# Patient Record
Sex: FEMALE | Race: WHITE | NOT HISPANIC OR LATINO | Employment: OTHER | URBAN - METROPOLITAN AREA
[De-identification: names, ages, dates, MRNs, and addresses within clinical notes are randomized per-mention and may not be internally consistent; named-entity substitution may affect disease eponyms.]

---

## 2021-01-14 DIAGNOSIS — Z23 NEED FOR VACCINATION: ICD-10-CM

## 2022-05-13 PROBLEM — M65.322 TRIGGER INDEX FINGER OF LEFT HAND: Status: ACTIVE | Noted: 2022-05-13

## 2022-05-27 PROBLEM — E66.9 OBESITY (BMI 30-39.9): Status: ACTIVE | Noted: 2022-05-27

## 2022-05-27 PROBLEM — E11.9 DIABETES MELLITUS, TYPE 2 (HCC): Status: ACTIVE | Noted: 2022-05-27

## 2022-05-27 PROBLEM — K21.9 GASTROESOPHAGEAL REFLUX DISEASE: Status: ACTIVE | Noted: 2022-05-27

## 2023-09-13 ENCOUNTER — APPOINTMENT (OUTPATIENT)
Dept: ADMISSIONS | Facility: MEDICAL CENTER | Age: 78
End: 2023-09-13
Attending: NEUROLOGICAL SURGERY
Payer: MEDICARE

## 2023-09-18 ENCOUNTER — PRE-ADMISSION TESTING (OUTPATIENT)
Dept: ADMISSIONS | Facility: MEDICAL CENTER | Age: 78
End: 2023-09-18
Attending: NEUROLOGICAL SURGERY
Payer: MEDICARE

## 2023-09-20 ENCOUNTER — APPOINTMENT (OUTPATIENT)
Dept: RADIOLOGY | Facility: MEDICAL CENTER | Age: 78
End: 2023-09-20
Attending: PHYSICIAN ASSISTANT
Payer: MEDICARE

## 2023-09-20 ENCOUNTER — HOSPITAL ENCOUNTER (OUTPATIENT)
Facility: MEDICAL CENTER | Age: 78
End: 2023-09-20
Attending: NEUROLOGICAL SURGERY | Admitting: NEUROLOGICAL SURGERY
Payer: MEDICARE

## 2023-09-20 VITALS
DIASTOLIC BLOOD PRESSURE: 81 MMHG | RESPIRATION RATE: 14 BRPM | SYSTOLIC BLOOD PRESSURE: 143 MMHG | HEART RATE: 56 BPM | BODY MASS INDEX: 36.89 KG/M2 | TEMPERATURE: 97.5 F | WEIGHT: 243.39 LBS | OXYGEN SATURATION: 97 % | HEIGHT: 68 IN

## 2023-09-20 DIAGNOSIS — M54.50 LOW BACK PAIN, UNSPECIFIED BACK PAIN LATERALITY, UNSPECIFIED CHRONICITY, UNSPECIFIED WHETHER SCIATICA PRESENT: ICD-10-CM

## 2023-09-20 DIAGNOSIS — M54.16 LUMBAR RADICULOPATHY: ICD-10-CM

## 2023-09-20 LAB
ERYTHROCYTE [DISTWIDTH] IN BLOOD BY AUTOMATED COUNT: 39.8 FL (ref 35.9–50)
HCT VFR BLD AUTO: 41.9 % (ref 37–47)
HGB BLD-MCNC: 14.2 G/DL (ref 12–16)
INR PPP: 1.05 (ref 0.87–1.13)
MCH RBC QN AUTO: 29 PG (ref 27–33)
MCHC RBC AUTO-ENTMCNC: 33.9 G/DL (ref 32.2–35.5)
MCV RBC AUTO: 85.5 FL (ref 81.4–97.8)
PLATELET # BLD AUTO: 257 K/UL (ref 164–446)
PMV BLD AUTO: 9.2 FL (ref 9–12.9)
PROTHROMBIN TIME: 13.8 SEC (ref 12–14.6)
RBC # BLD AUTO: 4.9 M/UL (ref 4.2–5.4)
WBC # BLD AUTO: 6.9 K/UL (ref 4.8–10.8)

## 2023-09-20 PROCEDURE — 62284 INJECTION FOR MYELOGRAM: CPT

## 2023-09-20 PROCEDURE — 85027 COMPLETE CBC AUTOMATED: CPT

## 2023-09-20 PROCEDURE — 72132 CT LUMBAR SPINE W/DYE: CPT

## 2023-09-20 PROCEDURE — 85610 PROTHROMBIN TIME: CPT

## 2023-09-20 PROCEDURE — 160047 HCHG PACU  - EA ADDL 30 MINS PHASE II

## 2023-09-20 PROCEDURE — 700117 HCHG RX CONTRAST REV CODE 255: Performed by: PHYSICIAN ASSISTANT

## 2023-09-20 PROCEDURE — 160002 HCHG RECOVERY MINUTES (STAT)

## 2023-09-20 PROCEDURE — 160046 HCHG PACU - 1ST 60 MINS PHASE II

## 2023-09-20 PROCEDURE — 36415 COLL VENOUS BLD VENIPUNCTURE: CPT

## 2023-09-20 RX ADMIN — IOHEXOL 12 ML: 180 INJECTION INTRAVENOUS at 15:00

## 2023-09-20 NOTE — DISCHARGE INSTRUCTIONS
Myelogram  A myelogram is an imaging test. This test checks for problems in the spinal cord and the places where nerves attach to the spinal cord (nerve roots).  A dye (contrast material) is put into your spine before the X-ray. This provides a clearer image for your doctor to see.  You may need this test if you have a spinal cord problem that cannot be diagnosed with other imaging tests. You may also have this test to check your spine after surgery.  Tell a doctor about:  Any allergies you have, especially to iodine.  All medicines you are taking, including vitamins, herbs, eye drops, creams, and over-the-counter medicines.  Any problems you or family members have had with anesthetic medicines or dye.  Any blood disorders you have.  Any surgeries you have had.  Any medical conditions you have or have had, including asthma.  Whether you are pregnant or may be pregnant.  What are the risks?  Generally, this is a safe procedure. However, problems may occur, including:  Infection.  Bleeding.  Allergic reaction to medicines or dyes.  Damage to your spinal cord or nerves.  Leaking of spinal fluid. This can cause a headache.  Damage to kidneys.  Seizures. This is rare.  What happens before the procedure?  Follow instructions from your doctor about what you cannot eat or drink. You may be asked to drink more fluids.  Ask your doctor about changing or stopping your normal medicines. This is important if you take diabetes medicines or blood thinners.  Plan to have someone take you home from the hospital or clinic.  If you will be going home right after the procedure, plan to have someone with you for 24 hours.  What happens during the procedure?  You will lie face down on a table.  Your doctor will find the best injection site on your spine. This is most often in the lower back.  This area will be washed with soap.  You will be given a medicine to numb the area (local anesthetic).  Your doctor will place a long needle into  the space around your spinal cord.  A sample of spinal fluid may be taken. This may be sent to the lab for testing.  The dye will be injected into the space around your spinal cord.  The exam table may be tilted. This helps the dye flow up or down your spine.  The X-ray will take images of your spinal cord.  A bandage (dressing) may be placed over the area where the dye was injected.  The procedure may vary among doctors and hospitals.  What can I expect after the procedure?  You may be monitored until you leave the hospital or clinic. This includes checking your blood pressure, heart rate, breathing rate, and blood oxygen level.  You may feel sore at the injection site. You may have a mild headache.  You will be told to lie flat with your head raised (elevated). This lowers the risk of a headache.  It is up to you to get the results of your procedure. Ask your doctor, or the department that is doing the procedure, when your results will be ready.  Follow these instructions at home:  Rest as told by your doctor. Lie flat with your head slightly elevated.  Do not bend, lift, or do hard work for 24-48 hours, or as told by your doctor.  Take over-the-counter and prescription medicines only as told by your doctor.  Take care of your bandage as told by your doctor. Keep clean and dry for 48 hours. So not soak or submerge (pools/hot tubs/baths) until cleared by physician.   Drink enough fluid to keep your pee (urine) pale yellow.  Bathe or shower as told by your doctor.  Contact a doctor if:  You have a fever.  You have a headache that lasts longer than 24 hours.  You feel sick to your stomach (nauseous).  You vomit.  Your neck is stiff.  Your legs feel numb.  You cannot pee.  You cannot poop (no bowel movement).  You have a rash.  You are itchy or sneezing.  Get help right away if:  You have new symptoms or your symptoms get worse.  You have a seizure.  You have trouble breathing.  Summary  A myelogram is an imaging test  that checks for problems in the spinal cord and the places where nerves attach to the spinal cord (nerve roots).  Before the procedure, follow instructions from your doctor. You will be told what not to eat or drink, or what medicines to change or stop.  After the procedure, you will be told to lie flat with your head raised (elevated). This will lower your risk of a headache.  Do not bend, lift, or do any hard work for 24-48 hours, or as told by your doctor.  Contact a doctor if you have a stiff neck or numb legs. Get help right away if your symptoms get worse, or you have a seizure or trouble breathing.  This information is not intended to replace advice given to you by your health care provider. Make sure you discuss any questions you have with your health care provider.  Document Revised: 04/11/2023 Document Reviewed: 02/27/2020  Elsevier Patient Education © 2023 [employer]

## 2023-09-20 NOTE — OR NURSING
1500  Assumed care of pt after report received. Remains on strict flat bedrest until 1544 reinforced with understanding verbalized.    1600  Bed rest complete, bandaid cdi. Ambulated well and voided. Ready for DC home iv dc with cath intact.

## 2023-09-20 NOTE — PROGRESS NOTES
1420- Patient admitted to recovery phase 2. A&Ox4 on room air. Lying flat on back. Dressing to midback CDI. Patient's family notified of bed rest until 1544.    1455- Handoff given to SUNITA Dempsey

## 2023-11-29 ENCOUNTER — PATIENT MESSAGE (OUTPATIENT)
Dept: HEALTH INFORMATION MANAGEMENT | Facility: OTHER | Age: 78
End: 2023-11-29

## 2024-02-16 ENCOUNTER — APPOINTMENT (OUTPATIENT)
Dept: ADMISSIONS | Facility: MEDICAL CENTER | Age: 79
End: 2024-02-16
Attending: NEUROLOGICAL SURGERY
Payer: MEDICARE

## 2024-02-16 PROBLEM — M48.062 NEUROGENIC CLAUDICATION DUE TO LUMBAR SPINAL STENOSIS: Status: ACTIVE | Noted: 2024-02-16

## 2024-02-21 ENCOUNTER — PRE-ADMISSION TESTING (OUTPATIENT)
Dept: ADMISSIONS | Facility: MEDICAL CENTER | Age: 79
End: 2024-02-21
Attending: NEUROLOGICAL SURGERY
Payer: MEDICARE

## 2024-02-21 RX ORDER — NAPROXEN SODIUM 220 MG
220 TABLET ORAL
Status: ON HOLD | COMMUNITY
End: 2024-03-27

## 2024-02-21 RX ORDER — CHOLECALCIFEROL (VITAMIN D3) 125 MCG
1 CAPSULE ORAL 2 TIMES DAILY
COMMUNITY

## 2024-02-22 ENCOUNTER — HOSPITAL ENCOUNTER (OUTPATIENT)
Dept: RADIOLOGY | Facility: MEDICAL CENTER | Age: 79
End: 2024-02-22
Attending: NEUROLOGICAL SURGERY | Admitting: NEUROLOGICAL SURGERY
Payer: MEDICARE

## 2024-02-22 ENCOUNTER — PRE-ADMISSION TESTING (OUTPATIENT)
Dept: ADMISSIONS | Facility: MEDICAL CENTER | Age: 79
End: 2024-02-22
Attending: NEUROLOGICAL SURGERY
Payer: MEDICARE

## 2024-02-22 DIAGNOSIS — M48.062 SPINAL STENOSIS OF LUMBAR REGION WITH NEUROGENIC CLAUDICATION: ICD-10-CM

## 2024-02-22 LAB
25(OH)D3 SERPL-MCNC: 63 NG/ML (ref 30–100)
ABO GROUP BLD: NORMAL
ANION GAP SERPL CALC-SCNC: 13 MMOL/L (ref 7–16)
APPEARANCE UR: CLEAR
APTT PPP: 30.4 SEC (ref 24.7–36)
BASOPHILS # BLD AUTO: 0.4 % (ref 0–1.8)
BASOPHILS # BLD: 0.03 K/UL (ref 0–0.12)
BILIRUB UR QL STRIP.AUTO: NEGATIVE
BLD GP AB SCN SERPL QL: NORMAL
BUN SERPL-MCNC: 16 MG/DL (ref 8–22)
CALCIUM SERPL-MCNC: 10.3 MG/DL (ref 8.5–10.5)
CHLORIDE SERPL-SCNC: 102 MMOL/L (ref 96–112)
CO2 SERPL-SCNC: 25 MMOL/L (ref 20–33)
COLOR UR: YELLOW
CREAT SERPL-MCNC: 0.75 MG/DL (ref 0.5–1.4)
EKG IMPRESSION: NORMAL
EOSINOPHIL # BLD AUTO: 0.09 K/UL (ref 0–0.51)
EOSINOPHIL NFR BLD: 1.3 % (ref 0–6.9)
ERYTHROCYTE [DISTWIDTH] IN BLOOD BY AUTOMATED COUNT: 40.3 FL (ref 35.9–50)
EST. AVERAGE GLUCOSE BLD GHB EST-MCNC: 131 MG/DL
GFR SERPLBLD CREATININE-BSD FMLA CKD-EPI: 81 ML/MIN/1.73 M 2
GLUCOSE SERPL-MCNC: 121 MG/DL (ref 65–99)
GLUCOSE UR STRIP.AUTO-MCNC: NEGATIVE MG/DL
HBA1C MFR BLD: 6.2 % (ref 4–5.6)
HCT VFR BLD AUTO: 44.7 % (ref 37–47)
HGB BLD-MCNC: 15 G/DL (ref 12–16)
IMM GRANULOCYTES # BLD AUTO: 0.01 K/UL (ref 0–0.11)
IMM GRANULOCYTES NFR BLD AUTO: 0.1 % (ref 0–0.9)
INR PPP: 1.01 (ref 0.87–1.13)
KETONES UR STRIP.AUTO-MCNC: NEGATIVE MG/DL
LEUKOCYTE ESTERASE UR QL STRIP.AUTO: NEGATIVE
LYMPHOCYTES # BLD AUTO: 2.31 K/UL (ref 1–4.8)
LYMPHOCYTES NFR BLD: 34.3 % (ref 22–41)
MCH RBC QN AUTO: 28.9 PG (ref 27–33)
MCHC RBC AUTO-ENTMCNC: 33.6 G/DL (ref 32.2–35.5)
MCV RBC AUTO: 86.1 FL (ref 81.4–97.8)
MICRO URNS: NORMAL
MONOCYTES # BLD AUTO: 0.45 K/UL (ref 0–0.85)
MONOCYTES NFR BLD AUTO: 6.7 % (ref 0–13.4)
NEUTROPHILS # BLD AUTO: 3.85 K/UL (ref 1.82–7.42)
NEUTROPHILS NFR BLD: 57.2 % (ref 44–72)
NITRITE UR QL STRIP.AUTO: NEGATIVE
NRBC # BLD AUTO: 0 K/UL
NRBC BLD-RTO: 0 /100 WBC (ref 0–0.2)
PH UR STRIP.AUTO: 6.5 [PH] (ref 5–8)
PLATELET # BLD AUTO: 277 K/UL (ref 164–446)
PMV BLD AUTO: 9.6 FL (ref 9–12.9)
POTASSIUM SERPL-SCNC: 4.3 MMOL/L (ref 3.6–5.5)
PROT UR QL STRIP: NEGATIVE MG/DL
PROTHROMBIN TIME: 13.4 SEC (ref 12–14.6)
RBC # BLD AUTO: 5.19 M/UL (ref 4.2–5.4)
RBC UR QL AUTO: NEGATIVE
RH BLD: NORMAL
SODIUM SERPL-SCNC: 140 MMOL/L (ref 135–145)
SP GR UR STRIP.AUTO: 1.01
UROBILINOGEN UR STRIP.AUTO-MCNC: 0.2 MG/DL
WBC # BLD AUTO: 6.7 K/UL (ref 4.8–10.8)

## 2024-02-22 PROCEDURE — 86900 BLOOD TYPING SEROLOGIC ABO: CPT

## 2024-02-22 PROCEDURE — 82306 VITAMIN D 25 HYDROXY: CPT

## 2024-02-22 PROCEDURE — 85730 THROMBOPLASTIN TIME PARTIAL: CPT

## 2024-02-22 PROCEDURE — 85025 COMPLETE CBC W/AUTO DIFF WBC: CPT

## 2024-02-22 PROCEDURE — 85610 PROTHROMBIN TIME: CPT

## 2024-02-22 PROCEDURE — 86850 RBC ANTIBODY SCREEN: CPT

## 2024-02-22 PROCEDURE — 80048 BASIC METABOLIC PNL TOTAL CA: CPT

## 2024-02-22 PROCEDURE — 83036 HEMOGLOBIN GLYCOSYLATED A1C: CPT | Mod: GA

## 2024-02-22 PROCEDURE — 93005 ELECTROCARDIOGRAM TRACING: CPT

## 2024-02-22 PROCEDURE — 81003 URINALYSIS AUTO W/O SCOPE: CPT

## 2024-02-22 PROCEDURE — 93010 ELECTROCARDIOGRAM REPORT: CPT | Performed by: INTERNAL MEDICINE

## 2024-02-22 PROCEDURE — 36415 COLL VENOUS BLD VENIPUNCTURE: CPT

## 2024-02-22 PROCEDURE — 86901 BLOOD TYPING SEROLOGIC RH(D): CPT

## 2024-02-22 PROCEDURE — 71046 X-RAY EXAM CHEST 2 VIEWS: CPT

## 2024-02-24 ENCOUNTER — HOSPITAL ENCOUNTER (OUTPATIENT)
Dept: RADIOLOGY | Facility: MEDICAL CENTER | Age: 79
End: 2024-02-24
Attending: PHYSICIAN ASSISTANT
Payer: MEDICARE

## 2024-02-24 DIAGNOSIS — M25.561 ACUTE PAIN OF RIGHT KNEE: ICD-10-CM

## 2024-02-24 DIAGNOSIS — M17.11 PRIMARY OSTEOARTHRITIS OF RIGHT KNEE: ICD-10-CM

## 2024-02-24 PROCEDURE — 73700 CT LOWER EXTREMITY W/O DYE: CPT | Mod: RT

## 2024-03-27 ENCOUNTER — APPOINTMENT (OUTPATIENT)
Dept: RADIOLOGY | Facility: MEDICAL CENTER | Age: 79
End: 2024-03-27
Attending: NEUROLOGICAL SURGERY
Payer: MEDICARE

## 2024-03-27 ENCOUNTER — HOSPITAL ENCOUNTER (OUTPATIENT)
Facility: MEDICAL CENTER | Age: 79
End: 2024-03-28
Attending: NEUROLOGICAL SURGERY | Admitting: NEUROLOGICAL SURGERY
Payer: MEDICARE

## 2024-03-27 ENCOUNTER — ANESTHESIA (OUTPATIENT)
Dept: SURGERY | Facility: MEDICAL CENTER | Age: 79
End: 2024-03-27
Payer: MEDICARE

## 2024-03-27 ENCOUNTER — ANESTHESIA EVENT (OUTPATIENT)
Dept: SURGERY | Facility: MEDICAL CENTER | Age: 79
End: 2024-03-27
Payer: MEDICARE

## 2024-03-27 DIAGNOSIS — G89.18 POSTOPERATIVE PAIN AFTER SPINAL SURGERY: ICD-10-CM

## 2024-03-27 PROBLEM — M54.16 LUMBAR RADICULOPATHY: Status: ACTIVE | Noted: 2024-03-27

## 2024-03-27 LAB
ABO GROUP BLD: NORMAL
BLD GP AB SCN SERPL QL: NORMAL
GLUCOSE BLD STRIP.AUTO-MCNC: 119 MG/DL (ref 65–99)
RH BLD: NORMAL

## 2024-03-27 PROCEDURE — 700106 HCHG RX REV CODE 271: Performed by: NEUROLOGICAL SURGERY

## 2024-03-27 PROCEDURE — 160041 HCHG SURGERY MINUTES - EA ADDL 1 MIN LEVEL 4: Performed by: NEUROLOGICAL SURGERY

## 2024-03-27 PROCEDURE — 72020 X-RAY EXAM OF SPINE 1 VIEW: CPT

## 2024-03-27 PROCEDURE — 160036 HCHG PACU - EA ADDL 30 MINS PHASE I: Performed by: NEUROLOGICAL SURGERY

## 2024-03-27 PROCEDURE — 700101 HCHG RX REV CODE 250: Performed by: NEUROLOGICAL SURGERY

## 2024-03-27 PROCEDURE — 86901 BLOOD TYPING SEROLOGIC RH(D): CPT

## 2024-03-27 PROCEDURE — 700111 HCHG RX REV CODE 636 W/ 250 OVERRIDE (IP): Mod: JZ | Performed by: ANESTHESIOLOGY

## 2024-03-27 PROCEDURE — 700102 HCHG RX REV CODE 250 W/ 637 OVERRIDE(OP): Performed by: PHYSICIAN ASSISTANT

## 2024-03-27 PROCEDURE — 63047 LAM FACETEC & FORAMOT LUMBAR: CPT | Performed by: NEUROLOGICAL SURGERY

## 2024-03-27 PROCEDURE — 82962 GLUCOSE BLOOD TEST: CPT

## 2024-03-27 PROCEDURE — 160009 HCHG ANES TIME/MIN: Performed by: NEUROLOGICAL SURGERY

## 2024-03-27 PROCEDURE — 86900 BLOOD TYPING SEROLOGIC ABO: CPT

## 2024-03-27 PROCEDURE — 110454 HCHG SHELL REV 250: Performed by: NEUROLOGICAL SURGERY

## 2024-03-27 PROCEDURE — A4306 DRUG DELIVERY SYSTEM <=50 ML: HCPCS | Performed by: NEUROLOGICAL SURGERY

## 2024-03-27 PROCEDURE — 96375 TX/PRO/DX INJ NEW DRUG ADDON: CPT | Mod: XU

## 2024-03-27 PROCEDURE — 63048 LAM FACETEC &FORAMOT EA ADDL: CPT | Performed by: NEUROLOGICAL SURGERY

## 2024-03-27 PROCEDURE — G0378 HOSPITAL OBSERVATION PER HR: HCPCS

## 2024-03-27 PROCEDURE — A9270 NON-COVERED ITEM OR SERVICE: HCPCS | Performed by: PHYSICIAN ASSISTANT

## 2024-03-27 PROCEDURE — 86850 RBC ANTIBODY SCREEN: CPT

## 2024-03-27 PROCEDURE — 110371 HCHG SHELL REV 272: Performed by: NEUROLOGICAL SURGERY

## 2024-03-27 PROCEDURE — 96365 THER/PROPH/DIAG IV INF INIT: CPT | Mod: XU

## 2024-03-27 PROCEDURE — 96366 THER/PROPH/DIAG IV INF ADDON: CPT | Mod: XU

## 2024-03-27 PROCEDURE — 700105 HCHG RX REV CODE 258: Performed by: NEUROLOGICAL SURGERY

## 2024-03-27 PROCEDURE — 160035 HCHG PACU - 1ST 60 MINS PHASE I: Performed by: NEUROLOGICAL SURGERY

## 2024-03-27 PROCEDURE — 63047 LAM FACETEC & FORAMOT LUMBAR: CPT | Mod: ASROC | Performed by: PHYSICIAN ASSISTANT

## 2024-03-27 PROCEDURE — 700111 HCHG RX REV CODE 636 W/ 250 OVERRIDE (IP): Performed by: PHYSICIAN ASSISTANT

## 2024-03-27 PROCEDURE — 700111 HCHG RX REV CODE 636 W/ 250 OVERRIDE (IP): Mod: JZ | Performed by: NEUROLOGICAL SURGERY

## 2024-03-27 PROCEDURE — 160002 HCHG RECOVERY MINUTES (STAT): Performed by: NEUROLOGICAL SURGERY

## 2024-03-27 PROCEDURE — 700101 HCHG RX REV CODE 250: Performed by: ANESTHESIOLOGY

## 2024-03-27 PROCEDURE — 700101 HCHG RX REV CODE 250: Performed by: PHYSICIAN ASSISTANT

## 2024-03-27 PROCEDURE — 36415 COLL VENOUS BLD VENIPUNCTURE: CPT

## 2024-03-27 PROCEDURE — 160048 HCHG OR STATISTICAL LEVEL 1-5: Performed by: NEUROLOGICAL SURGERY

## 2024-03-27 PROCEDURE — 160029 HCHG SURGERY MINUTES - 1ST 30 MINS LEVEL 4: Performed by: NEUROLOGICAL SURGERY

## 2024-03-27 PROCEDURE — 63048 LAM FACETEC &FORAMOT EA ADDL: CPT | Mod: ASROC | Performed by: PHYSICIAN ASSISTANT

## 2024-03-27 RX ORDER — DEXAMETHASONE SODIUM PHOSPHATE 4 MG/ML
INJECTION, SOLUTION INTRA-ARTICULAR; INTRALESIONAL; INTRAMUSCULAR; INTRAVENOUS; SOFT TISSUE PRN
Status: DISCONTINUED | OUTPATIENT
Start: 2024-03-27 | End: 2024-03-27 | Stop reason: SURG

## 2024-03-27 RX ORDER — LABETALOL HYDROCHLORIDE 5 MG/ML
10 INJECTION, SOLUTION INTRAVENOUS
Status: DISCONTINUED | OUTPATIENT
Start: 2024-03-27 | End: 2024-03-28 | Stop reason: HOSPADM

## 2024-03-27 RX ORDER — VANCOMYCIN HYDROCHLORIDE 500 MG/10ML
INJECTION, POWDER, LYOPHILIZED, FOR SOLUTION INTRAVENOUS
Status: COMPLETED | OUTPATIENT
Start: 2024-03-27 | End: 2024-03-27

## 2024-03-27 RX ORDER — TRANEXAMIC ACID 100 MG/ML
INJECTION, SOLUTION INTRAVENOUS PRN
Status: DISCONTINUED | OUTPATIENT
Start: 2024-03-27 | End: 2024-03-27 | Stop reason: SURG

## 2024-03-27 RX ORDER — SODIUM CHLORIDE AND POTASSIUM CHLORIDE 150; 900 MG/100ML; MG/100ML
INJECTION, SOLUTION INTRAVENOUS CONTINUOUS
Status: DISCONTINUED | OUTPATIENT
Start: 2024-03-27 | End: 2024-03-28 | Stop reason: HOSPADM

## 2024-03-27 RX ORDER — POLYETHYLENE GLYCOL 3350 17 G/17G
1 POWDER, FOR SOLUTION ORAL 2 TIMES DAILY PRN
Status: DISCONTINUED | OUTPATIENT
Start: 2024-03-27 | End: 2024-03-28 | Stop reason: HOSPADM

## 2024-03-27 RX ORDER — TRANEXAMIC ACID 100 MG/ML
1000 INJECTION, SOLUTION INTRAVENOUS ONCE
Status: DISCONTINUED | OUTPATIENT
Start: 2024-03-27 | End: 2024-03-27 | Stop reason: HOSPADM

## 2024-03-27 RX ORDER — ONDANSETRON 4 MG/1
4 TABLET, ORALLY DISINTEGRATING ORAL EVERY 4 HOURS PRN
Status: DISCONTINUED | OUTPATIENT
Start: 2024-03-27 | End: 2024-03-28 | Stop reason: HOSPADM

## 2024-03-27 RX ORDER — ACETAMINOPHEN 500 MG
1000 TABLET ORAL EVERY 6 HOURS PRN
Status: DISCONTINUED | OUTPATIENT
Start: 2024-04-01 | End: 2024-03-28 | Stop reason: HOSPADM

## 2024-03-27 RX ORDER — CEFAZOLIN SODIUM 1 G/3ML
2 INJECTION, POWDER, FOR SOLUTION INTRAMUSCULAR; INTRAVENOUS ONCE
Status: DISCONTINUED | OUTPATIENT
Start: 2024-03-27 | End: 2024-03-27 | Stop reason: HOSPADM

## 2024-03-27 RX ORDER — OXYCODONE HCL 5 MG/5 ML
10 SOLUTION, ORAL ORAL
Status: DISCONTINUED | OUTPATIENT
Start: 2024-03-27 | End: 2024-03-27 | Stop reason: HOSPADM

## 2024-03-27 RX ORDER — BACLOFEN 10 MG/1
5 TABLET ORAL 3 TIMES DAILY PRN
Status: DISCONTINUED | OUTPATIENT
Start: 2024-03-27 | End: 2024-03-28 | Stop reason: HOSPADM

## 2024-03-27 RX ORDER — AMOXICILLIN 250 MG
1 CAPSULE ORAL NIGHTLY
Status: DISCONTINUED | OUTPATIENT
Start: 2024-03-27 | End: 2024-03-28 | Stop reason: HOSPADM

## 2024-03-27 RX ORDER — BUPIVACAINE HYDROCHLORIDE AND EPINEPHRINE 5; 5 MG/ML; UG/ML
INJECTION, SOLUTION EPIDURAL; INTRACAUDAL; PERINEURAL
Status: DISCONTINUED | OUTPATIENT
Start: 2024-03-27 | End: 2024-03-27 | Stop reason: HOSPADM

## 2024-03-27 RX ORDER — KETOROLAC TROMETHAMINE 15 MG/ML
INJECTION, SOLUTION INTRAMUSCULAR; INTRAVENOUS PRN
Status: DISCONTINUED | OUTPATIENT
Start: 2024-03-27 | End: 2024-03-27 | Stop reason: SURG

## 2024-03-27 RX ORDER — HYDROMORPHONE HYDROCHLORIDE 1 MG/ML
0.2 INJECTION, SOLUTION INTRAMUSCULAR; INTRAVENOUS; SUBCUTANEOUS
Status: DISCONTINUED | OUTPATIENT
Start: 2024-03-27 | End: 2024-03-27 | Stop reason: HOSPADM

## 2024-03-27 RX ORDER — MIDAZOLAM HYDROCHLORIDE 1 MG/ML
INJECTION INTRAMUSCULAR; INTRAVENOUS PRN
Status: DISCONTINUED | OUTPATIENT
Start: 2024-03-27 | End: 2024-03-27 | Stop reason: SURG

## 2024-03-27 RX ORDER — BISACODYL 10 MG
10 SUPPOSITORY, RECTAL RECTAL
Status: DISCONTINUED | OUTPATIENT
Start: 2024-03-27 | End: 2024-03-28 | Stop reason: HOSPADM

## 2024-03-27 RX ORDER — PHENYLEPHRINE HYDROCHLORIDE 10 MG/ML
INJECTION, SOLUTION INTRAMUSCULAR; INTRAVENOUS; SUBCUTANEOUS PRN
Status: DISCONTINUED | OUTPATIENT
Start: 2024-03-27 | End: 2024-03-27 | Stop reason: SURG

## 2024-03-27 RX ORDER — HYDROMORPHONE HYDROCHLORIDE 1 MG/ML
0.4 INJECTION, SOLUTION INTRAMUSCULAR; INTRAVENOUS; SUBCUTANEOUS
Status: DISCONTINUED | OUTPATIENT
Start: 2024-03-27 | End: 2024-03-27 | Stop reason: HOSPADM

## 2024-03-27 RX ORDER — MORPHINE SULFATE 4 MG/ML
2 INJECTION INTRAVENOUS EVERY 4 HOURS PRN
Status: DISCONTINUED | OUTPATIENT
Start: 2024-03-27 | End: 2024-03-28 | Stop reason: HOSPADM

## 2024-03-27 RX ORDER — HYDROMORPHONE HYDROCHLORIDE 1 MG/ML
0.1 INJECTION, SOLUTION INTRAMUSCULAR; INTRAVENOUS; SUBCUTANEOUS
Status: DISCONTINUED | OUTPATIENT
Start: 2024-03-27 | End: 2024-03-27 | Stop reason: HOSPADM

## 2024-03-27 RX ORDER — METHYLPREDNISOLONE ACETATE 40 MG/ML
INJECTION, SUSPENSION INTRA-ARTICULAR; INTRALESIONAL; INTRAMUSCULAR; SOFT TISSUE
Status: DISCONTINUED | OUTPATIENT
Start: 2024-03-27 | End: 2024-03-27 | Stop reason: HOSPADM

## 2024-03-27 RX ORDER — ONDANSETRON 2 MG/ML
4 INJECTION INTRAMUSCULAR; INTRAVENOUS
Status: DISCONTINUED | OUTPATIENT
Start: 2024-03-27 | End: 2024-03-27 | Stop reason: HOSPADM

## 2024-03-27 RX ORDER — AMOXICILLIN 250 MG
1 CAPSULE ORAL
Status: DISCONTINUED | OUTPATIENT
Start: 2024-03-27 | End: 2024-03-28 | Stop reason: HOSPADM

## 2024-03-27 RX ORDER — OXYCODONE HCL 5 MG/5 ML
5 SOLUTION, ORAL ORAL
Status: DISCONTINUED | OUTPATIENT
Start: 2024-03-27 | End: 2024-03-27 | Stop reason: HOSPADM

## 2024-03-27 RX ORDER — CEFAZOLIN SODIUM 1 G/3ML
INJECTION, POWDER, FOR SOLUTION INTRAMUSCULAR; INTRAVENOUS PRN
Status: DISCONTINUED | OUTPATIENT
Start: 2024-03-27 | End: 2024-03-27 | Stop reason: SURG

## 2024-03-27 RX ORDER — ONDANSETRON 2 MG/ML
INJECTION INTRAMUSCULAR; INTRAVENOUS PRN
Status: DISCONTINUED | OUTPATIENT
Start: 2024-03-27 | End: 2024-03-27 | Stop reason: SURG

## 2024-03-27 RX ORDER — DIPHENHYDRAMINE HYDROCHLORIDE 50 MG/ML
12.5 INJECTION INTRAMUSCULAR; INTRAVENOUS
Status: DISCONTINUED | OUTPATIENT
Start: 2024-03-27 | End: 2024-03-27 | Stop reason: HOSPADM

## 2024-03-27 RX ORDER — TRAMADOL HYDROCHLORIDE 50 MG/1
100 TABLET ORAL EVERY 4 HOURS PRN
Status: DISCONTINUED | OUTPATIENT
Start: 2024-03-27 | End: 2024-03-28 | Stop reason: HOSPADM

## 2024-03-27 RX ORDER — HALOPERIDOL 5 MG/ML
1 INJECTION INTRAMUSCULAR
Status: DISCONTINUED | OUTPATIENT
Start: 2024-03-27 | End: 2024-03-27 | Stop reason: HOSPADM

## 2024-03-27 RX ORDER — CEFAZOLIN SODIUM 1 G/3ML
INJECTION, POWDER, FOR SOLUTION INTRAMUSCULAR; INTRAVENOUS
Status: DISCONTINUED | OUTPATIENT
Start: 2024-03-27 | End: 2024-03-27 | Stop reason: HOSPADM

## 2024-03-27 RX ORDER — ENEMA 19; 7 G/133ML; G/133ML
1 ENEMA RECTAL
Status: DISCONTINUED | OUTPATIENT
Start: 2024-03-27 | End: 2024-03-28 | Stop reason: HOSPADM

## 2024-03-27 RX ORDER — DIPHENHYDRAMINE HCL 25 MG
25 TABLET ORAL EVERY 6 HOURS PRN
Status: DISCONTINUED | OUTPATIENT
Start: 2024-03-27 | End: 2024-03-28 | Stop reason: HOSPADM

## 2024-03-27 RX ORDER — TRAMADOL HYDROCHLORIDE 50 MG/1
50 TABLET ORAL EVERY 4 HOURS PRN
Status: DISCONTINUED | OUTPATIENT
Start: 2024-03-27 | End: 2024-03-28 | Stop reason: HOSPADM

## 2024-03-27 RX ORDER — ALPRAZOLAM 0.25 MG/1
0.25 TABLET ORAL 2 TIMES DAILY PRN
Status: DISCONTINUED | OUTPATIENT
Start: 2024-03-27 | End: 2024-03-28 | Stop reason: HOSPADM

## 2024-03-27 RX ORDER — SODIUM CHLORIDE, SODIUM LACTATE, POTASSIUM CHLORIDE, AND CALCIUM CHLORIDE .6; .31; .03; .02 G/100ML; G/100ML; G/100ML; G/100ML
IRRIGANT IRRIGATION
Status: DISCONTINUED | OUTPATIENT
Start: 2024-03-27 | End: 2024-03-27 | Stop reason: HOSPADM

## 2024-03-27 RX ORDER — MAGNESIUM HYDROXIDE 1200 MG/15ML
LIQUID ORAL
Status: COMPLETED | OUTPATIENT
Start: 2024-03-27 | End: 2024-03-27

## 2024-03-27 RX ORDER — DOCUSATE SODIUM 100 MG/1
100 CAPSULE, LIQUID FILLED ORAL 2 TIMES DAILY
Status: DISCONTINUED | OUTPATIENT
Start: 2024-03-27 | End: 2024-03-28 | Stop reason: HOSPADM

## 2024-03-27 RX ORDER — DIPHENHYDRAMINE HYDROCHLORIDE 50 MG/ML
25 INJECTION INTRAMUSCULAR; INTRAVENOUS EVERY 6 HOURS PRN
Status: DISCONTINUED | OUTPATIENT
Start: 2024-03-27 | End: 2024-03-28 | Stop reason: HOSPADM

## 2024-03-27 RX ORDER — ACETAMINOPHEN 500 MG
1000 TABLET ORAL EVERY 6 HOURS
Status: DISCONTINUED | OUTPATIENT
Start: 2024-03-27 | End: 2024-03-28 | Stop reason: HOSPADM

## 2024-03-27 RX ORDER — SODIUM CHLORIDE, SODIUM LACTATE, POTASSIUM CHLORIDE, CALCIUM CHLORIDE 600; 310; 30; 20 MG/100ML; MG/100ML; MG/100ML; MG/100ML
INJECTION, SOLUTION INTRAVENOUS CONTINUOUS
Status: ACTIVE | OUTPATIENT
Start: 2024-03-27 | End: 2024-03-27

## 2024-03-27 RX ORDER — MORPHINE SULFATE 0.5 MG/ML
INJECTION, SOLUTION EPIDURAL; INTRATHECAL; INTRAVENOUS
Status: DISCONTINUED | OUTPATIENT
Start: 2024-03-27 | End: 2024-03-27 | Stop reason: HOSPADM

## 2024-03-27 RX ORDER — SODIUM CHLORIDE, SODIUM LACTATE, POTASSIUM CHLORIDE, CALCIUM CHLORIDE 600; 310; 30; 20 MG/100ML; MG/100ML; MG/100ML; MG/100ML
INJECTION, SOLUTION INTRAVENOUS CONTINUOUS
Status: DISCONTINUED | OUTPATIENT
Start: 2024-03-27 | End: 2024-03-27 | Stop reason: HOSPADM

## 2024-03-27 RX ORDER — LIDOCAINE HYDROCHLORIDE 20 MG/ML
INJECTION, SOLUTION EPIDURAL; INFILTRATION; INTRACAUDAL; PERINEURAL PRN
Status: DISCONTINUED | OUTPATIENT
Start: 2024-03-27 | End: 2024-03-27 | Stop reason: SURG

## 2024-03-27 RX ORDER — ONDANSETRON 2 MG/ML
4 INJECTION INTRAMUSCULAR; INTRAVENOUS EVERY 4 HOURS PRN
Status: DISCONTINUED | OUTPATIENT
Start: 2024-03-27 | End: 2024-03-28 | Stop reason: HOSPADM

## 2024-03-27 RX ADMIN — SODIUM CHLORIDE, POTASSIUM CHLORIDE, SODIUM LACTATE AND CALCIUM CHLORIDE: 600; 310; 30; 20 INJECTION, SOLUTION INTRAVENOUS at 09:26

## 2024-03-27 RX ADMIN — TRANEXAMIC ACID 1000 MG: 100 INJECTION, SOLUTION INTRAVENOUS at 12:02

## 2024-03-27 RX ADMIN — TRAMADOL HYDROCHLORIDE 100 MG: 50 TABLET ORAL at 14:02

## 2024-03-27 RX ADMIN — KETOROLAC TROMETHAMINE 15 MG: 15 INJECTION, SOLUTION INTRAMUSCULAR; INTRAVENOUS at 13:43

## 2024-03-27 RX ADMIN — DEXAMETHASONE SODIUM PHOSPHATE 8 MG: 4 INJECTION INTRA-ARTICULAR; INTRALESIONAL; INTRAMUSCULAR; INTRAVENOUS; SOFT TISSUE at 12:02

## 2024-03-27 RX ADMIN — MIDAZOLAM HYDROCHLORIDE 2 MG: 1 INJECTION, SOLUTION INTRAMUSCULAR; INTRAVENOUS at 11:53

## 2024-03-27 RX ADMIN — DOCUSATE SODIUM 100 MG: 100 CAPSULE, LIQUID FILLED ORAL at 17:07

## 2024-03-27 RX ADMIN — LIDOCAINE HYDROCHLORIDE 100 MG: 20 INJECTION, SOLUTION EPIDURAL; INFILTRATION; INTRACAUDAL at 12:02

## 2024-03-27 RX ADMIN — ACETAMINOPHEN 1000 MG: 500 TABLET, FILM COATED ORAL at 17:07

## 2024-03-27 RX ADMIN — ONDANSETRON 4 MG: 2 INJECTION INTRAMUSCULAR; INTRAVENOUS at 12:02

## 2024-03-27 RX ADMIN — PROPOFOL 200 MG: 10 INJECTION, EMULSION INTRAVENOUS at 12:02

## 2024-03-27 RX ADMIN — CEFAZOLIN 2 G: 1 INJECTION, POWDER, FOR SOLUTION INTRAMUSCULAR; INTRAVENOUS at 12:05

## 2024-03-27 RX ADMIN — CEFAZOLIN 2 G: 10 INJECTION, POWDER, FOR SOLUTION INTRAVENOUS at 22:23

## 2024-03-27 RX ADMIN — FENTANYL CITRATE 100 MCG: 50 INJECTION, SOLUTION INTRAMUSCULAR; INTRAVENOUS at 12:02

## 2024-03-27 RX ADMIN — DOCUSATE SODIUM 50 MG AND SENNOSIDES 8.6 MG 1 TABLET: 8.6; 5 TABLET, FILM COATED ORAL at 21:15

## 2024-03-27 RX ADMIN — POTASSIUM CHLORIDE AND SODIUM CHLORIDE: 900; 150 INJECTION, SOLUTION INTRAVENOUS at 17:12

## 2024-03-27 RX ADMIN — LIDOCAINE HYDROCHLORIDE 0.5 ML: 10 INJECTION, SOLUTION EPIDURAL; INFILTRATION; INTRACAUDAL at 09:25

## 2024-03-27 RX ADMIN — PHENYLEPHRINE HYDROCHLORIDE 100 MCG: 10 INJECTION INTRAVENOUS at 12:38

## 2024-03-27 RX ADMIN — Medication 1 APPLICATOR: at 21:15

## 2024-03-27 RX ADMIN — METHOCARBAMOL 1000 MG: 100 INJECTION, SOLUTION INTRAMUSCULAR; INTRAVENOUS at 14:12

## 2024-03-27 RX ADMIN — SUGAMMADEX 200 MG: 100 INJECTION, SOLUTION INTRAVENOUS at 13:43

## 2024-03-27 ASSESSMENT — PAIN SCALES - WONG BAKER
WONGBAKER_NUMERICALRESPONSE: HURTS EVEN MORE
WONGBAKER_NUMERICALRESPONSE: HURTS A LITTLE MORE
WONGBAKER_NUMERICALRESPONSE: HURTS A WHOLE LOT
WONGBAKER_NUMERICALRESPONSE: HURTS A WHOLE LOT
WONGBAKER_NUMERICALRESPONSE: HURTS A LITTLE MORE
WONGBAKER_NUMERICALRESPONSE: HURTS A WHOLE LOT
WONGBAKER_NUMERICALRESPONSE: HURTS A LITTLE MORE

## 2024-03-27 ASSESSMENT — PAIN DESCRIPTION - PAIN TYPE
TYPE: ACUTE PAIN
TYPE: ACUTE PAIN;SURGICAL PAIN
TYPE: ACUTE PAIN

## 2024-03-27 NOTE — LETTER
February 26, 2024    Patient Name: Rylee Cheung  Surgeon Name: Chelsie Wiseman M.D.  Surgery Facility: [facility] ([employer] [facility])  Surgery Date: 3/27/2024    The time of your surgery is not final and may change up to and until the day of your surgery. You will be contacted 1-2 business days prior to your surgery date with your check-in and surgery time.    BEFORE YOUR SURGERY - WITH THE FACILITY  Pre Registration and/or Lab Work/Tests must be done within 60 days of your surgery date. These will be done at [facility], with an appointment. This appointment should be completed before your pre op appointment in our office.    On 3/18 - if you have not already heard from [facility] Pre Admission/Registration Department, please call them at [phone] option 2, then option 1, for an appointment.      BEFORE YOUR SURGERY - WITH YOUR SURGEONS OFFICE  Pre op Appointment:   Date: 03/07/24   Time: 3:00pm   Provider: Frankie Wilson PA-C    Location: [address] [place] [address]    Bring a list of all medications you are currently taking including the dosing and frequency.    Please read the MEDICATION INSTRUCTIONS below completely.    DAY OF YOUR SURGERY  Nothing to eat or drink and refrain from smoking any substance after midnight the day of your surgery. Smoking may interfere with the anesthetic and frequently produces nausea during the recovery period.    Continue taking all lifesaving medications, including the morning of your surgery with small sip of water.    You will need a responsible adult to drive you to and from your surgery.    AFTER YOUR SURGERY  2 Week Post op Appointment:   Date: 04/10/24   Time: 4:00PM  With: Frankie Wilson PA-C   Location: [facility]    6 Week Post op Appointment:   Date: 05/09/24   Time: 4:00PM   With: Frankie Wilson PA-C  Location: [facility]    MEDICATION INSTRUCTIONS  Do not take these medications prior to your procedure:             Anti-inflammatories: stop 7 days prior, restart when advised. For fusions avoid for 12 weeks after surgery            Naproxen (Naprosyn or Alleve)            Motrin            Ibuprofen            Nabumetone (Relafen)            Meloxicam (Mobic)            Celebrex            Salsalate            Diclofenac (Arthrotec, Voltaren, Flector)            Sulindac (Clinoril            Etodolac (Lodine)            Indomethacin (Indocin)            Ketoprofen            Ketorolac            Oxaprozin (Daypro)            Piroxicam (Feldene)            Blood thinners (stop after approval from the prescribing physician)            Aspirin (any dosage): Stop 14 days prior, restart 7 days after procedure            Any medications that contain aspirin in combination (ie: Excedrin migraine, Fiorinal, and Norgesic)            Warfarin (Coumadin): Stop 14 days prior, INR day of procedure, restart 2-3 weeks after procedure            Antiplatelet: Stop 14 days prior, restart 7 days after procedure            Ticlid (ticlopidine): Stop 14 days prior            Plavix (clopidogrel): Stop 7 days prior            Aggrenox or Dipyridamole: Stop 14 days prior            ReoPro (abciximab): Stop 14 days prior            Integrilin (eptifibatide): Stop 14 days prior            Aggrastat (tirofiban): Stop 14 days prior            Lovenox (Enoxaparin): Stop 24hrs before and restart 24hrs after procedure            Heparin: Stop 24hrs before             Dalteparin (Fragmin): Stop 24hrs before            Fondaparinux (Arixtra): Stop 24hrs before            Xeralto, Dabigatran (Pradaxa) Stop 5 days prior            Eliquis (apibaxan) Stop 7 days prior    Okay to take these medications as prescribed:            Muscle relaxers            Acetaminophen and pain medications that have it in addition to oxycodone and hydrocodone            Blood pressure, cholesterol and diabetes medications are ok      TIME OFF WORK  FMLA or Disability forms can be  faxed directly to [phone] or you may drop them off at any [facility]. Our office charges a $35.00 fee. Forms will be completed within 5-10 business days after payment is received. For the status of your forms you may contact our disability office directly at [phone].    DENTAL PROCDURES/CLEANINGS Avoid 3 weeks before surgery and for 3 months after surgery.    QUESTIONS ABOUT COSTS  Contact our Patient Financial Services department at [phone] for more information.    If you have any questions, please contact our office.    Caro   Surgery Scheduler  [email or web address]  ? [phone]   Fax: [phone]  EXT [address]  [place] [id number]  [phone]

## 2024-03-27 NOTE — ANESTHESIA PREPROCEDURE EVALUATION
Case: [id number] Date/Time: 03/27/24 1145    Procedure: L3/4, L4/5 and L5/S1 decompressive laminectomy and bilateral foraminotomies: (Spine Lumbar)    Anesthesia type: General    Diagnosis: Neurogenic claudication due to lumbar spinal stenosis [M48.062]    Pre-op diagnosis: Neurogenic claudication due to lumbar spinal stenosis    Location: [facility] OR 07 / SURGERY [facility]    Surgeons: Chelsie Wiseman M.D.            Relevant Problems   GI   (positive) Gastroesophageal reflux disease      ENDO   (positive) Diabetes mellitus, type 2 (HCC)       Physical Exam    Airway   Mallampati: II  TM distance: >3 FB  Neck ROM: full       Cardiovascular - normal exam  Rhythm: regular  Rate: normal  (-) murmur     Dental - normal exam           Pulmonary - normal exam  Breath sounds clear to auscultation     Abdominal    Neurological - normal exam                   Anesthesia Plan    ASA 3   ASA physical status 3 criteria: other (comment) and morbid obesity - BMI greater than or equal to 40    Plan - general               Induction: intravenous    Postoperative Plan: Postoperative administration of opioids is intended.    Pertinent diagnostic labs and testing reviewed    Informed Consent:    Anesthetic plan and risks discussed with patient.    Use of blood products discussed with: patient whom consented to blood products.

## 2024-03-27 NOTE — PROGRESS NOTES
POD#0  Seen and examined in PACU in conjunction with Dr. Wiseman.  Patient is moving legs and arms.   Hemovac is compressed. Dressing is dry.  Routine postoperative care   Admit overnight, if stable aim for home tomorrow afternoon

## 2024-03-27 NOTE — ANESTHESIA POSTPROCEDURE EVALUATION
Patient: Rlyee Cheung    Procedure Summary       Date: 03/27/24 Room / Location: [facility] 07 / SURGERY [facility]    Anesthesia Start: 1153 Anesthesia Stop: 1344    Procedure: L3/4, L4/5 and L5/S1 decompressive laminectomy and bilateral foraminotomies: (Spine Lumbar) Diagnosis:       Neurogenic claudication due to lumbar spinal stenosis      (Neurogenic claudication due to lumbar spinal stenosis)    Surgeons: Chelsie Wiseman M.D. Responsible Provider: Toan Hu M.D.    Anesthesia Type: general ASA Status: 3            Final Anesthesia Type: general  Last vitals  BP   Blood Pressure : (!) 168/86    Temp   36.2 °C (97.2 °F)    Pulse   83   Resp   13    SpO2   98 %      Anesthesia Post Evaluation    Patient location during evaluation: PACU  Patient participation: complete - patient participated  Level of consciousness: awake and alert    Airway patency: patent  Anesthetic complications: no  Cardiovascular status: hemodynamically stable  Respiratory status: acceptable  Hydration status: euvolemic    PONV: none          No notable events documented.     Nurse Pain Score: 5 (NPRS)

## 2024-03-27 NOTE — LETTER
February 16, 2024    Patient Name: Rylee Cheung  Surgeon Name: Chelsie Wiseman M.D.  Surgery Facility: [facility] ([address] [facility])  Surgery Date: 3/18/2024    The time of your surgery is not final and may change up to and until the day of your surgery. You will be contacted 1-2 business days prior to your surgery date with your check-in and surgery time.    BEFORE YOUR SURGERY - WITH THE FACILITY  Pre Registration and/or Lab Work/Tests must be done within 60 days of your surgery date. These will be done at [facility], with an appointment. This appointment should be completed before your pre op appointment in our office.    On 3/4 - if you have not already heard from [facility] Pre Admission/Registration Department, please call them at [phone] option 2, then option 1, for an appointment.      BEFORE YOUR SURGERY - WITH YOUR SURGEONS OFFICE  Pre op Appointment:   Date: 03/07/24   Time: 3:00PM   Provider: Frankie Wilson PA-C    Location: [address] [place] [address]    Bring a list of all medications you are currently taking including the dosing and frequency.    Please read the MEDICATION INSTRUCTIONS below completely.    DAY OF YOUR SURGERY  Nothing to eat or drink and refrain from smoking any substance after midnight the day of your surgery. Smoking may interfere with the anesthetic and frequently produces nausea during the recovery period.    Continue taking all lifesaving medications, including the morning of your surgery with small sip of water.    You will need a responsible adult to drive you to and from your surgery.    AFTER YOUR SURGERY  2 Week Post op Appointment:   Date: 04/04/24   Time: 3:00PM  With: Frankie Wilson PA-C   Location: [facility]    6 Week Post op Appointment:   Date: 05/02/24   Time: 3:00PM   With: Frankie Wilson PA-C   Location: [facility]    MEDICATION INSTRUCTIONS  Do not take these medications prior to your procedure:             Anti-inflammatories: stop 7 days prior, restart when advised. For fusions avoid for 12 weeks after surgery            Naproxen (Naprosyn or Alleve)            Motrin            Ibuprofen            Nabumetone (Relafen)            Meloxicam (Mobic)            Celebrex            Salsalate            Diclofenac (Arthrotec, Voltaren, Flector)            Sulindac (Clinoril            Etodolac (Lodine)            Indomethacin (Indocin)            Ketoprofen            Ketorolac            Oxaprozin (Daypro)            Piroxicam (Feldene)            Blood thinners (stop after approval from the prescribing physician)            Aspirin (any dosage): Stop 14 days prior, restart 7 days after procedure            Any medications that contain aspirin in combination (ie: Excedrin migraine, Fiorinal, and Norgesic)            Warfarin (Coumadin): Stop 14 days prior, INR day of procedure, restart 2-3 weeks after procedure            Antiplatelet: Stop 14 days prior, restart 7 days after procedure            Ticlid (ticlopidine): Stop 14 days prior            Plavix (clopidogrel): Stop 7 days prior            Aggrenox or Dipyridamole: Stop 14 days prior            ReoPro (abciximab): Stop 14 days prior            Integrilin (eptifibatide): Stop 14 days prior            Aggrastat (tirofiban): Stop 14 days prior            Lovenox (Enoxaparin): Stop 24hrs before and restart 24hrs after procedure            Heparin: Stop 24hrs before             Dalteparin (Fragmin): Stop 24hrs before            Fondaparinux (Arixtra): Stop 24hrs before            Xeralto, Dabigatran (Pradaxa) Stop 5 days prior            Eliquis (apibaxan) Stop 7 days prior    Okay to take these medications as prescribed:            Muscle relaxers            Acetaminophen and pain medications that have it in addition to oxycodone and hydrocodone            Blood pressure, cholesterol and diabetes medications are ok      TIME OFF WORK  FMLA or Disability forms can be  faxed directly to [phone] or you may drop them off at any [facility]. Our office charges a $35.00 fee. Forms will be completed within 5-10 business days after payment is received. For the status of your forms you may contact our disability office directly at [phone].    DENTAL PROCDURES/CLEANINGS Avoid 3 weeks before surgery and for 3 months after surgery.    QUESTIONS ABOUT COSTS  Contact our Patient Financial Services department at [phone] for more information.    If you have any questions, please contact our office.    Caro   Surgery Scheduler  [email or web address]  ? [phone]   Fax: [phone]  EXT [address]  [place] [id number]  [phone]

## 2024-03-27 NOTE — OP REPORT
1. DATE OF SURGERY: 3/27/2024    2. SURGEON:  Chelsie Wiseman MD, PhD, ESEQUIEL, Neurosurgeon    3. ASSISTANT:  Frankie Wilson PA-C  An assistant was crucial to have during the case as the assistant helped with positioning, keeping the field clear of blood and retraction. This case could not be done easily without a qualified assistant. It was medically necessary    4. TYPE OF ANESTHESIA:  General anesthesia with endotracheal intubation    5. PREOPERATIVE DIAGNOSIS:  Lumbar spinal stenosis    Mild stenosis T12-L1, L2-3 and  Moderate stenosis L1-2 and L4-5 and L5/S1 .  Severe stenosis at L3-4.  20 years of progressive low back and bilateral leg symptomatology  Underlying diabetes which is stable with exercise  4. Failed conservative therapy    6. POSTOPERATIVE DIAGNOSIS:  Lumbar spinal stenosis    Mild stenosis T12-L1, L2-3 and  Moderate stenosis L1-2 and L4-5 and L5/S1 .  Severe stenosis at L3-4.  20 years of progressive low back and bilateral leg symptomatology  Underlying diabetes which is stable with exercise  4. Failed conservative therapy    7. HISTORY: See formal admission H and P    7/17/2023   Rylee Cheung is a 78 y.o. female seen today in a neurosurgery/spine consultation.  Her daughter was in attendance at today's visit.  She reports breaking her back nearly 60 years ago.  She did well up until approximately 40 years ago.  At that time she was counseled to have surgery.  She has persisted with back pain since that time.  Today she reports greater than 20-year history of low back pain with bilateral leg pain.  This is dense aching through the entirety of her legs.  She does have associated numbness and tingling.  She had a nerve study several years ago and was told that she had neuropathy.  She presently rates her pain as 8-9/10.  Generally if she sits she does all right if she stands or walks for greater than 5 minutes she gets increasing back and leg symptoms.  She has done physical therapy in the past.   She has had good relief with massage and exercises in the gym.  Somewhere along the line she trialed gabapentin without relief.  She has been taking Aleve sparingly although this is upsetting her stomach.        In essence, this is a 78-year-old female with persistent back and bilateral leg symptomatology progressively worsening over the last 20 years.        She denies cardiac or pulmonary issues.  She does have history of diabetes which is controlled with exercise.  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  She is retired.  She was a  in the past.     10/3/2023  Returns for review.  Chief complaint maintained at low back and bilateral leg pain.  Since her last visit she trialed the meloxicam, Lyrica and Robaxin.  The meloxicam help with her knee pain no significant improvement of her back symptoms.  She noticed no relief with Lyrica or Robaxin.  She does not do physical therapy as she does not feel this is helpful for her.  She had a CT myelogram, here to review.  She reiterates that she has claudicatory back and leg symptoms generally if she stands or walks for 5 to 10 minutes she must sit down.  She gets relief with a shopping cart and a walker.  This is interfering with her quality of life.  Her pain today is 5/10.        1/4/2024  Patient returns.  Had an epidural in October.   relief for 2 weeks.  This was in October.  When she is sitting she is good.  When she stands she gets predominantly feeling of weakness in the legs.  She has a positive shopping cart sign.  Options discussed.  Will start PT and do another epidural and regroup in 6 weeks.  Surgery is on the table.     2/16/2024  Patient returns.  Had another epidural.  Was good but with PT pain flared up.  At rest she has right leg pain down the back of the right leg to the knee.  When she stands she gets backache and bilateral buttock  pain.  She is using a walker and seated balance standing.  Options discussed.  I think it is worthwhile decompressing it.  Symptoms worse with PT.    8. PREOPERATIVE PHYSICAL EXAMINATION: See formal admission H and P    Mildly reduced range of motion of the lumbar spine.  She does not tolerate forward flexion.  Strength is preserved in the bilateral lower extremities.  Reflexes are blunted throughout.  She has diminished pinprick sensation in her lateral feet bilaterally.  Low back is nontender.              Imaging Result(s):   I independently reviewed and assessed the imaging. I also reviewed all imaging reports.      7/17/2023   4 view lumbar x-rays from the [facility] dated 7/17/2023 were reviewed.  This is AP, lateral and flexion extension maneuvers.  There is dextroscoliosis centered at L3-4 with levoscoliosis centered at T12/L1.  Diffuse degenerative changes with prominent facet arthropathy L4-5 and L5-S1.  Degenerative changes throughout the lumbar spine.  No obvious fractures.     She had a old MRI of her lumbar spine dated May 26, 2005 these were printed films.  This shows again degenerative changes throughout the lumbar spine.  There is prominent facet arthropathy L4-5 and L5-S1.  There is borderline L4-5 stenosis.     10/3/2023  CT myelogram from [facility] was reviewed.  This is dated 9/20/2023.  Advanced degenerative changes throughout the lumbar spine.  Significant Modic endplate changes at L3-4.  She has moderate stenosis at L1-2, L4 5.  Mild stenosis L2-3 and L5-S1.  Severe stenosis L3-4.    9. TITLE OF PROCEDURE:    L3 Decompressive lumbar laminectomy with bilateral foraminotomies  L4 Decompressive lumbar laminectomy with bilateral foraminotomies  L5 Decompressive lumbar laminectomy with bilateral foraminotomies  S1 Decompressive lumbar laminectomy with bilateral foraminotomies  i/o On-Q Pain catheters in paraspinal musculature    10. OPERATIVE FINDINGS: Severe lateral recess stenosis due  to a combination of ligamentous hypertrophy and facet arthropathy.  Was seen L3-4 and L4-5    11. OPERATED LEVELS: L3-4 L4-5 L5-S1    12. IMPLANTS USED: Nil    13. COMPLICATIONS:  Nil.    14. ESTIMATED BLOOD LOSS:        100 mL      15. OPERATIVE DETAILS:  After fully informed consent, the patient was brought to the operating room.  General anesthesia was administered.  The patient was given intravenous antibiotic.  The patient was then turned prone onto the OSI operating table  and Jordin frame.  All pressure points were padded.  Initial fluoroscopic localization was taken for skin marking.  The posterior lumbar region was prepped and draped in a standard fashion.      Using the initial x-ray as a guide, a midline skin incision was then affected.  This involved the spinous processes at the affected levels where the laminectomies were to be done.  A bilateral subperiosteal exposure was affected.  Great care was taken not to violate the facet joints.      Once the exposure had been done, self-retaining retractors were placed.  Hemostasis was obtained.  A repeat lateral x-ray was then taken to confirm the level.  Laminectomy was affected. The laminectomy was affected with Leksell rongeurs initially, then I used an AM-8 drill bit under microscopic magnification and illumination to thin down the lamina.      Once the lamina was paper thin, I used combination of 4, 3, and 2 mm Kerrison punches to remove the remaining bone and ligamentum flavum.  I worked from a cranial to a caudal direction so that I was in the line of the nerve roots.  Initially, a central decompression was affected, then in an undercutting fashion, I removed the ligamentum flavum and lateral recesses with great care not to take too much of the facet joints.      For stenosis L3-4.  There was marked facet arthropathy and ligamentous hypertrophy.    Sequentially, I followed the L3, L4, L5, S1 nerve roots on both sides and a complete decompression was  affected.      Hemostasis was obtained.  All the nerve roots L3, L4, L5, S1   were free.  I could pass a blunt dissector at each of the foramina with ease.  Hemostasis was obtained with a combination of Gelfoam and diathermy.  I then placed a vancomycin powder over the wound, but not on the dura.  A suction subfascial Hemovac was placed.       I placed On-Q pain catheters bilaterally in the paraspinal musculature through stab incisions.    I placed local anaesthetic throughout the paraspinal muscles.       The wound was then closed using multiple interrupted Vicryl sutures starting with 0 Vicryl sutures for the deep fascia, 2-0 Vicryl sutures for the sub-dermis and then staples for the skin.    Hemostasis was obtained.  A 2-0 vicryl was used for the drain.  Dermabond was placed over the puncture sites of the On-Q catheters.  A dressing was applied.      All counts were correct at the end of the case and all instrumentation was accounted for.  The patient was then carefully turned supine again onto a regular operating table without incident.    At the end of case, the patient was moving both lower extremities well.    16 PROGNOSIS:  The surgery went well.  We plan to get the patient home in the next 24 to 48 hours. When the patient goes home, he/she will be discharged home on narcotic analgesia,  a muscle relaxant and oral antibiotic, usually Keflex.  The plan will be to follow up in 2 weeks in the office.  We will call the patient next week to ensure there are not any problems.  He/she can shower in 72 hours but is instructed to keep the wound dry.  The patient has also been instructed to abstain from smoking or any anti-inflammatories.     Chelsie Wiseman MD, PhD, ESEQUIEL, Neurosurgeon    CC:   Elizabeth R Jaffe, M.D. [place]

## 2024-03-27 NOTE — ANESTHESIA PROCEDURE NOTES
Airway    Date/Time: 3/27/2024 12:04 PM    Performed by: Toan Hu M.D.  Authorized by: Toan Hu M.D.    Location:  OR  Urgency:  Elective  Indications for Airway Management:  Anesthesia      Spontaneous Ventilation: absent    Sedation Level:  Deep  Preoxygenated: Yes    Patient Position:  Sniffing  Final Airway Type:  Endotracheal airway  Final Endotracheal Airway:  ETT  Cuffed: Yes    Technique Used for Successful ETT Placement:  Direct laryngoscopy    Insertion Site:  Oral  Blade Type:  Perez  Laryngoscope Blade/Videolaryngoscope Blade Size:  3  ETT Size (mm):  7.0  Measured from:  Teeth  ETT to Teeth (cm):  20  Placement Verified by: auscultation and capnometry    Cormack-Lehane Classification:  Grade I - full view of glottis  Number of Attempts at Approach:  1

## 2024-03-27 NOTE — OR SURGEON
Immediate Post OP Note    PreOp Diagnosis:     Mild stenosis T12-L1, L2-3 and  Moderate stenosis L1-2 and L4-5 and L5/S1 .  Severe stenosis at L3-4.  20 years of progressive low back and bilateral leg symptomatology  Underlying diabetes which is stable with exercise  4. Failed conservative therapy      PostOp Diagnosis:     Mild stenosis T12-L1, L2-3 and  Moderate stenosis L1-2 and L4-5 and L5/S1 .  Severe stenosis at L3-4.  20 years of progressive low back and bilateral leg symptomatology  Underlying diabetes which is stable with exercise  4. Failed conservative therapy      Procedure(s):  L3/4, L4/5 and L5/S1 decompressive laminectomy and bilateral foraminotomies: - Wound Class: Clean with Drain    Surgeon(s):  Chelsie Wiseman M.D.    Anesthesiologist/Type of Anesthesia:  Anesthesiologist: Toan Hu M.D./General    Surgical Staff:  Circulator: Lukas D. Gansert, R.N.  Scrub Person: Ailyn Hogan  First Assist: Frankie Wilson P.A.-C.  Radiology Technologist: Ailyn Cross    Specimens removed if any:  * No specimens in log *    Assistants: Frankie Wilson PA-C  ,  Specimen: nil    Estimated Blood Loss: 100 cc    Findings: n/a    Complications: nil        3/27/2024 1:21 PM Chelsie Wiseman M.D.

## 2024-03-27 NOTE — ANESTHESIA TIME REPORT
Anesthesia Start and Stop Event Times       Date Time Event    3/27/2024 1140 Ready for Procedure     1153 Anesthesia Start     1344 Anesthesia Stop          Responsible Staff  03/27/24      Name Role Begin End    Toan Hu M.D. Anesth 1153 1344          Overtime Reason:  no overtime (within assigned shift)    Comments:

## 2024-03-27 NOTE — PROGRESS NOTES
UPDATE TO HISTORY AND PHYSICAL    I met with patient and any family members in preop. Again discussed planned surgery. I went over the risks, benefits and alternatives of the surgery in the office visit. I had discussed any off label use of products. The patient had been given literature to read about the procedure in person and via email if able and had access to the office note via the patient portal. I Answered any questions remaining from prior visits.     There was no change to my last H and P (it may be labelled a progress note or a H and P in EPIC).   The note was made by either myself, or my PAs Choco Cooley or Frankie Wilson but is signed by me, if it was done by my physician assistant.   I verify it is correct and has my co-signature.    Chelsie Wiseman MD PhD ESEQUIEL

## 2024-03-28 ENCOUNTER — PHARMACY VISIT (OUTPATIENT)
Dept: PHARMACY | Facility: MEDICAL CENTER | Age: 79
End: 2024-03-28
Payer: COMMERCIAL

## 2024-03-28 VITALS
DIASTOLIC BLOOD PRESSURE: 72 MMHG | RESPIRATION RATE: 15 BRPM | TEMPERATURE: 97.5 F | OXYGEN SATURATION: 97 % | HEART RATE: 53 BPM | SYSTOLIC BLOOD PRESSURE: 123 MMHG | WEIGHT: 246.25 LBS | BODY MASS INDEX: 38.65 KG/M2 | HEIGHT: 67 IN

## 2024-03-28 LAB
ANION GAP SERPL CALC-SCNC: 10 MMOL/L (ref 7–16)
APTT PPP: 27.1 SEC (ref 24.7–36)
BUN SERPL-MCNC: 16 MG/DL (ref 8–22)
CALCIUM SERPL-MCNC: 9.1 MG/DL (ref 8.5–10.5)
CHLORIDE SERPL-SCNC: 105 MMOL/L (ref 96–112)
CO2 SERPL-SCNC: 22 MMOL/L (ref 20–33)
CREAT SERPL-MCNC: 0.58 MG/DL (ref 0.5–1.4)
EKG IMPRESSION: NORMAL
ERYTHROCYTE [DISTWIDTH] IN BLOOD BY AUTOMATED COUNT: 40.2 FL (ref 35.9–50)
GFR SERPLBLD CREATININE-BSD FMLA CKD-EPI: 92 ML/MIN/1.73 M 2
GLUCOSE SERPL-MCNC: 160 MG/DL (ref 65–99)
HCT VFR BLD AUTO: 38 % (ref 37–47)
HGB BLD-MCNC: 12.8 G/DL (ref 12–16)
INR PPP: 1.11 (ref 0.87–1.13)
MCH RBC QN AUTO: 28.6 PG (ref 27–33)
MCHC RBC AUTO-ENTMCNC: 33.7 G/DL (ref 32.2–35.5)
MCV RBC AUTO: 84.8 FL (ref 81.4–97.8)
PLATELET # BLD AUTO: 244 K/UL (ref 164–446)
PMV BLD AUTO: 9.4 FL (ref 9–12.9)
POTASSIUM SERPL-SCNC: 5.1 MMOL/L (ref 3.6–5.5)
PROTHROMBIN TIME: 14.5 SEC (ref 12–14.6)
RBC # BLD AUTO: 4.48 M/UL (ref 4.2–5.4)
SODIUM SERPL-SCNC: 137 MMOL/L (ref 135–145)
WBC # BLD AUTO: 7.1 K/UL (ref 4.8–10.8)

## 2024-03-28 PROCEDURE — 97535 SELF CARE MNGMENT TRAINING: CPT

## 2024-03-28 PROCEDURE — 85027 COMPLETE CBC AUTOMATED: CPT

## 2024-03-28 PROCEDURE — 97165 OT EVAL LOW COMPLEX 30 MIN: CPT

## 2024-03-28 PROCEDURE — 99024 POSTOP FOLLOW-UP VISIT: CPT | Performed by: PHYSICIAN ASSISTANT

## 2024-03-28 PROCEDURE — 700102 HCHG RX REV CODE 250 W/ 637 OVERRIDE(OP): Performed by: PHYSICIAN ASSISTANT

## 2024-03-28 PROCEDURE — 96376 TX/PRO/DX INJ SAME DRUG ADON: CPT

## 2024-03-28 PROCEDURE — 700111 HCHG RX REV CODE 636 W/ 250 OVERRIDE (IP): Performed by: PHYSICIAN ASSISTANT

## 2024-03-28 PROCEDURE — G0378 HOSPITAL OBSERVATION PER HR: HCPCS

## 2024-03-28 PROCEDURE — A9270 NON-COVERED ITEM OR SERVICE: HCPCS | Performed by: PHYSICIAN ASSISTANT

## 2024-03-28 PROCEDURE — 80048 BASIC METABOLIC PNL TOTAL CA: CPT

## 2024-03-28 PROCEDURE — 93005 ELECTROCARDIOGRAM TRACING: CPT | Performed by: PHYSICIAN ASSISTANT

## 2024-03-28 PROCEDURE — 97166 OT EVAL MOD COMPLEX 45 MIN: CPT

## 2024-03-28 PROCEDURE — 93010 ELECTROCARDIOGRAM REPORT: CPT | Performed by: INTERNAL MEDICINE

## 2024-03-28 PROCEDURE — RXMED WILLOW AMBULATORY MEDICATION CHARGE: Performed by: PHYSICIAN ASSISTANT

## 2024-03-28 PROCEDURE — 97161 PT EVAL LOW COMPLEX 20 MIN: CPT

## 2024-03-28 PROCEDURE — 85730 THROMBOPLASTIN TIME PARTIAL: CPT

## 2024-03-28 PROCEDURE — 36415 COLL VENOUS BLD VENIPUNCTURE: CPT

## 2024-03-28 PROCEDURE — 700101 HCHG RX REV CODE 250: Performed by: PHYSICIAN ASSISTANT

## 2024-03-28 PROCEDURE — 96366 THER/PROPH/DIAG IV INF ADDON: CPT | Mod: XU

## 2024-03-28 PROCEDURE — 85610 PROTHROMBIN TIME: CPT

## 2024-03-28 RX ORDER — CEPHALEXIN 500 MG/1
500 CAPSULE ORAL 4 TIMES DAILY
Status: DISCONTINUED | OUTPATIENT
Start: 2024-03-28 | End: 2024-03-28 | Stop reason: HOSPADM

## 2024-03-28 RX ORDER — TRAMADOL HYDROCHLORIDE 50 MG/1
50 TABLET ORAL EVERY 6 HOURS PRN
Qty: 28 TABLET | Refills: 0 | Status: SHIPPED | OUTPATIENT
Start: 2024-03-28 | End: 2024-04-04

## 2024-03-28 RX ORDER — BACLOFEN 5 MG/1
5 TABLET ORAL 3 TIMES DAILY PRN
Qty: 90 TABLET | Refills: 0 | Status: SHIPPED | OUTPATIENT
Start: 2024-03-28

## 2024-03-28 RX ORDER — CEPHALEXIN 500 MG/1
500 CAPSULE ORAL 4 TIMES DAILY
Qty: 20 CAPSULE | Refills: 0 | Status: ACTIVE | OUTPATIENT
Start: 2024-03-28 | End: 2024-04-02

## 2024-03-28 RX ORDER — ACETAMINOPHEN 500 MG
1000 TABLET ORAL EVERY 6 HOURS
Qty: 120 TABLET | Refills: 0 | Status: SHIPPED | OUTPATIENT
Start: 2024-03-28

## 2024-03-28 RX ADMIN — Medication 1 APPLICATOR: at 05:06

## 2024-03-28 RX ADMIN — DOCUSATE SODIUM 100 MG: 100 CAPSULE, LIQUID FILLED ORAL at 05:05

## 2024-03-28 RX ADMIN — CEPHALEXIN 500 MG: 500 CAPSULE ORAL at 12:15

## 2024-03-28 RX ADMIN — ACETAMINOPHEN 1000 MG: 500 TABLET, FILM COATED ORAL at 05:05

## 2024-03-28 RX ADMIN — CEPHALEXIN 500 MG: 500 CAPSULE ORAL at 08:30

## 2024-03-28 RX ADMIN — POTASSIUM CHLORIDE AND SODIUM CHLORIDE: 900; 150 INJECTION, SOLUTION INTRAVENOUS at 05:11

## 2024-03-28 RX ADMIN — ACETAMINOPHEN 1000 MG: 500 TABLET, FILM COATED ORAL at 11:48

## 2024-03-28 RX ADMIN — CEFAZOLIN 2 G: 10 INJECTION, POWDER, FOR SOLUTION INTRAVENOUS at 05:10

## 2024-03-28 RX ADMIN — TRAMADOL HYDROCHLORIDE 50 MG: 50 TABLET ORAL at 12:14

## 2024-03-28 ASSESSMENT — GAIT ASSESSMENTS
GAIT LEVEL OF ASSIST: SUPERVISED
DEVIATION: NO DEVIATION
ASSISTIVE DEVICE: FRONT WHEEL WALKER
DISTANCE (FEET): 300

## 2024-03-28 ASSESSMENT — COGNITIVE AND FUNCTIONAL STATUS - GENERAL
DAILY ACTIVITIY SCORE: 22
MOBILITY SCORE: 18
SUGGESTED CMS G CODE MODIFIER MOBILITY: CK
MOVING TO AND FROM BED TO CHAIR: A LITTLE
TURNING FROM BACK TO SIDE WHILE IN FLAT BAD: A LITTLE
MOVING FROM LYING ON BACK TO SITTING ON SIDE OF FLAT BED: A LITTLE
HELP NEEDED FOR BATHING: A LITTLE
WALKING IN HOSPITAL ROOM: A LITTLE
CLIMB 3 TO 5 STEPS WITH RAILING: A LITTLE
STANDING UP FROM CHAIR USING ARMS: A LITTLE
SUGGESTED CMS G CODE MODIFIER DAILY ACTIVITY: CJ
DRESSING REGULAR LOWER BODY CLOTHING: A LITTLE

## 2024-03-28 ASSESSMENT — ACTIVITIES OF DAILY LIVING (ADL): TOILETING: INDEPENDENT

## 2024-03-28 ASSESSMENT — PAIN DESCRIPTION - PAIN TYPE: TYPE: ACUTE PAIN

## 2024-03-28 NOTE — PROGRESS NOTES
Iv removed. Draigns and q pump removed. Pt. Getting dressed. Has all belongings. Meds to beds to be delivered shortly. Pt. To leave via wheelchair and CNA. No questions or concerns at this time.

## 2024-03-28 NOTE — DISCHARGE PLANNING
Case Management Discharge Planning    Admission Date: 3/27/2024  GMLOS:    ALOS: 0    6-Clicks ADL Score:    6-Clicks Mobility Score:        Anticipated Discharge Dispo:      DME Needed: No    Action(s) Taken: Updated Provider/Nurse on Discharge Plan    Escalations Completed: None    Medically Clear: Yes    Next Steps: n/a    Barriers to Discharge: None    Is the patient up for discharge today Yes    Is transport arranged for discharge disposition: Yes    Patient discussed in with the P.A. .  Patient is medically cleared.  Patient will be discharged to home.  Patient will arrange for transportation to their discharge destination.  Patient has no case management discharge needs.   Patient is s/p on 03/27/2024:  L3/4, L4/5 and L5/S1 decompressive laminectomy and bilateral foraminotomies: - Wound Class: Clean with Drain

## 2024-03-28 NOTE — PROGRESS NOTES
"Neurosurgery  POD# 1  Seen with Dr. Wiseman  Ambulating  Voiding  Tolerating oral medications  Denies nausea, vomiting, headache  Pain controlled on current medication regimen  Leg symptoms improved  Intermittent chest pressure overnight, no clear chest pain - ECG ordered STAT    3/28/2024  ECG without acute coronary findings, sinus wanda with IV conduction delay with Left ventricular hypertrophy  She was dropping O2 sats overnight with chest pressure concurrently, her symptoms were resolved with O2, She has no chest pain or pressure this morning.    Objective:  /59   Pulse 69   Temp 36.2 °C (97.2 °F) (Temporal)   Resp 16   Ht 1.702 m (5' 7\")   Wt 112 kg (246 lb 4.1 oz)   SpO2 94%     Intake/Output Summary (Last 24 hours) at 3/28/2024 0724  Last data filed at 3/28/2024 0400  Gross per 24 hour   Intake 1600 ml   Output 110 ml   Net 1490 ml       Recent Labs     03/28/24  0229   WBC 7.1   RBC 4.48   HEMOGLOBIN 12.8   HEMATOCRIT 38.0   MCV 84.8   MCH 28.6   MCHC 33.7   RDW 40.2   PLATELETCT 244   MPV 9.4     Recent Labs     03/28/24  0229   SODIUM 137   POTASSIUM 5.1   CHLORIDE 105   CO2 22   GLUCOSE 160*   BUN 16     Recent Labs     03/28/24  0229   APTT 27.1   INR 1.11     VSS  LS  Hemovac 45cc/8hr am  Surgical incision clean, dry, intact, no evidence of infection  Strength:  Lower extremities are 5/5 grossly  Otherwise neurologically intact    Assessment:  Active Hospital Problems    Diagnosis     Lumbar radiculopathy [M54.16]     Neurogenic claudication due to lumbar spinal stenosis [M48.062]      POD#1 S/p L3-S1 laminectomy  Chemoprophylaxis: No    Plan:  1. Ambulate with PT/OT as tolerated  2. Advance diet as tolerated  3. D/c hemovac and on-q pump at 1400 this afternoon, home thereafter.  4. Meds to beds for d/c scripts, call with any issues  "

## 2024-03-28 NOTE — DISCHARGE INSTRUCTIONS
FOLLOW UP ITEMS POST DISCHARGE  Follow up with our office in 2, 6, and 12 weeks.  Report to ER with any complications.  Call our office with any questions or concerns.  No anti-inflammatories for 2 weeks, no blood thinners for 2 weeks.   Clear to shower Saturday, pat incision dry, no special creams or ointments.   Avoid bending, lifting and twisting.   Walk 4-6 times per day as tolerated to help prevent blood clots.

## 2024-03-28 NOTE — THERAPY
Physical Therapy   Initial Evaluation     Patient Name: Rylee Cheung  Age:  78 y.o., Sex:  female  Medical Record #: [id number]  Today's Date: 3/28/2024     Precautions  Precautions: (P) Spinal / Back Precautions     Assessment    78 y.o. female presents  with spinal precautions limiting mobility s/p L3-S1 decompressive laminectomies and B foraminotomies.  She demonstrates good strength and sensation with supervision for mobility using a FWW while maintaining spinal precautions.  No acute PT needs are indicated.  She will have family assist when she goes home.  Recommend outpatient PT if deemed appropriate by her surgeon at follow up.    Plan    Physical Therapy Initial Treatment Plan   Duration: (P) Evaluation only    DC Equipment Recommendations: (P) None (Pt has a FWW and rollator)  Discharge Recommendations: (P)  (outpatient PT if deemed appropriate by surgeon at follow up)            Objective       03/28/24 1023   Initial Contact Note    Initial Contact Note Order Received and Verified, Physical Therapy Evaluation in Progress with Full Report to Follow.   Precautions   Precautions Spinal / Back Precautions    Pain 0 - 10 Group   Therapist Pain Assessment Post Activity Pain Same as Prior to Activity   Prior Living Situation   Housing / Facility 1 Story House   Steps Into Home 2  (ramp to back door)   Rail None   Bathroom Set up Walk In Shower   Equipment Owned Front-Wheel Walker;4-Wheel Walker;Tub / Shower Seat;Bed Side Commode;Adjustable Bed Without Rails;Grab Bar(s) In Tub / Shower  (lift chair)   Lives with - Patient's Self Care Capacity Alone and Able to Care For Self   Comments sister will be staying with her, daughter also lives in the area   Prior Level of Functional Mobility   Bed Mobility Independent   Transfer Status Independent   Ambulation Independent   Ambulation Distance community   Assistive Devices Used 4-Wheel Walker  (in the community, no AD in the home)   History of Falls   History of  Falls Yes  (Pt reports 1 fall x1 year)   Date of Last Fall   (2 weeks ago)   Cognition    Cognition / Consciousness WDL   Level of Consciousness Alert   Passive ROM Lower Body   Passive ROM Lower Body WDL   Active ROM Lower Body    Active ROM Lower Body  WDL   Strength Lower Body   Lower Body Strength  WDL   Sensation Lower Body   Lower Extremity Sensation   WDL   Balance Assessment   Sitting Balance (Static) Good   Sitting Balance (Dynamic) Fair +   Standing Balance (Static) Fair +   Standing Balance (Dynamic) Fair +   Weight Shift Sitting Good   Weight Shift Standing Fair   Bed Mobility    Comments   (Pt up in chair pre and post session)   Gait Analysis   Gait Level Of Assist Supervised   Assistive Device Front Wheel Walker   Distance (Feet) 300   # of Times Distance was Traveled 1   Deviation No deviation   Weight Bearing Status FWB   Functional Mobility   Sit to Stand Supervised   Bed, Chair, Wheelchair Transfer Supervised  (with FWW)   Transfer Method Stand Step   Activity Tolerance   Sitting in Chair   (pre and post session)   Edema / Skin Assessment   Edema / Skin  Not Assessed   Education Group   Education Provided Role of Physical Therapist   Role of Physical Therapist Patient Response Patient;Acceptance;Explanation;Verbal Demonstration;Action Demonstration   Additional Comments Pt educated on ambulating frequently throughout the day, monitoring for soreness the next day and not overdoing it when she gets home - reminder of 24 -48 hour delayed soreness onset; daughter also in the room- information acknowledged   Physical Therapy Initial Treatment Plan    Duration Evaluation only   Anticipated Discharge Equipment and Recommendations   DC Equipment Recommendations None  (Pt has a FWW and rollator)   Discharge Recommendations   (outpatient PT if deemed appropriate by surgeon at follow up)   Interdisciplinary Plan of Care Collaboration   IDT Collaboration with  Nursing   Patient Position at End of Therapy In  Bed;Call Light within Reach;Tray Table within Reach;Family / Friend in Room  (daughter in the room)   Collaboration Comments RN updated   Session Information   Date / Session Number  3/28- eval only

## 2024-03-28 NOTE — THERAPY
Occupational Therapy   Initial Evaluation     Patient Name: Rylee Cheung  Age:  78 y.o., Sex:  female  Medical Record #: [id number]  Today's Date: 3/28/2024     Precautions: Spinal / Back Precautions     Assessment    Patient is 78 y.o. female s/p L3-S1 laminectomy, reviewed post-op precautions and provided education on compensatory strategies for ADLs. Post-op packet provided for reference of education. Demonstrated use of reacher and sock aid for independent LB dressing while maintaining lumbar spine pxns, pt receptive and return demonstrated use with Kellie. Pt supportive daughter at bedside confirms DC support. Anticipate DC home once medically cleared.     Plan    Occupational Therapy Initial Treatment Plan   Duration: Discharge Needs Only    DC Equipment Recommendations: None, Unable to determine at this time, Reacher, Sock Aide  Discharge Recommendations: Anticipate that the patient will have no further occupational therapy needs after discharge from the hospital.      Objective       03/28/24 0935   Prior Living Situation   Prior Services None   Housing / Facility 1 Story House   Steps Into Home 2   Bathroom Set up Walk In Shower;Shower Chair;Grab Bars   Equipment Owned Front-Wheel Walker;Tub / Shower Seat;Grab Bar(s) In Tub / Shower;Grab Bar(s) By Toilet   Lives with - Patient's Self Care Capacity Alone and Able to Care For Self   Comments pt's sister plans to stay with her for 2 weeks and her daughter lives nearby   Prior Level of ADL Function   Self Feeding Independent   Grooming / Hygiene Independent   Bathing Independent   Dressing Independent   Toileting Independent   Prior Level of IADL Function   Medication Management Independent   Laundry Independent   Kitchen Mobility Independent   Finances Independent   Home Management Independent   Shopping Independent   Prior Level Of Mobility Independent Without Device in Community   Driving / Transportation Driving Independent   Precautions   Precautions  Spinal / Back Precautions    Pain 0 - 10 Group   Therapist Pain Assessment Post Activity Pain Same as Prior to Activity;0;Nurse Notified   Cognition    Cognition / Consciousness WDL   Level of Consciousness Alert   Strength Upper Body   Upper Body Strength  WDL   Upper Body Muscle Tone   Upper Body Muscle Tone  WDL   Coordination Upper Body   Coordination WDL   Balance Assessment   Sitting Balance (Static) Good   Sitting Balance (Dynamic) Fair +   Standing Balance (Static) Fair +   Standing Balance (Dynamic) Fair   Weight Shift Sitting Good   Weight Shift Standing Fair   Comments FWW   Bed Mobility    Supine to Sit Supervised   Scooting Supervised   Rolling Supervised   Comments log roll from flat bed   ADL Assessment   Eating Modified Independent   Grooming Supervision;Standing   Upper Body Dressing Supervision   Lower Body Dressing Minimal Assist  (with adaptive equipment)   Toileting Supervision   How much help from another person does the patient currently need...   Putting on and taking off regular lower body clothing? 3   Bathing (including washing, rinsing, and drying)? 3   Toileting, which includes using a toilet, bedpan, or urinal? 4   Putting on and taking off regular upper body clothing? 4   Taking care of personal grooming such as brushing teeth? 4   Eating meals? 4   6 Clicks Daily Activity Score 22   Functional Mobility   Sit to Stand Supervised   Bed, Chair, Wheelchair Transfer Supervised   Toilet Transfers Supervised   Transfer Method Stand Step   Mobility FWW   Activity Tolerance   Comments functional for self-care ADLs with intermittent assist from family   Education Group   Education Provided Role of Occupational Therapist;Activities of Daily Living;Transfers;Home Safety;Adaptive Equipment;Weight Bearing Precautions;Spinal Precautions   Role of Occupational Therapist Patient Response Patient;Acceptance;Explanation;Verbal Demonstration   Spinal Precautions Patient Response  Patient;Acceptance;Explanation;Demonstration;Handout;Verbal Demonstration;Action Demonstration   Home Safety Patient Response Patient;Acceptance;Explanation;Demonstration;Handout;Verbal Demonstration;Action Demonstration   Transfers Patient Response Patient;Acceptance;Explanation;Demonstration;Handout;Verbal Demonstration;Action Demonstration   ADL Patient Response Patient;Acceptance;Explanation;Demonstration;Handout;Verbal Demonstration;Action Demonstration   Adaptive Equipment Patient Response Patient;Acceptance;Explanation;Demonstration;Handout;Verbal Demonstration;Action Demonstration   Weight Bearing Precautions Patient Response Patient;Acceptance;Explanation;Demonstration;Handout;Verbal Demonstration;Action Demonstration   Occupational Therapy Initial Treatment Plan    Duration Discharge Needs Only   Problem List   Problem List None   Anticipated Discharge Equipment and Recommendations   DC Equipment Recommendations None;Unable to determine at this time;Reacher;Sock Aide   Discharge Recommendations Anticipate that the patient will have no further occupational therapy needs after discharge from the hospital.

## 2024-03-28 NOTE — PROGRESS NOTES
162 Report received from Simona DORSEY.    1636 Pt arrived to unit from PACU. Pt transferred from [facility] to hospital bed with x1 assist with FWW. Pt AAOx4, VSS on room air. Surgical dressing to back CDI. Pt oriented to room and to call light. Discussed plan of care. All needs met at this time. Bed locked and in lowest position. Bed alarm on.

## 2024-03-28 NOTE — CARE PLAN
Problem: Pain - Standard  Goal: Alleviation of pain or a reduction in pain to the patient’s comfort goal  Outcome: Progressing     Problem: Knowledge Deficit - Standard  Goal: Patient and family/care givers will demonstrate understanding of plan of care, disease process/condition, diagnostic tests and medications  Outcome: Progressing       The patient is Stable - Low risk of patient condition declining or worsening    Shift Goals  Clinical Goals: Pain control, safety  Patient Goals: Comfort    Progress made toward(s) clinical / shift goals:  Taught pt 0-10 pain scale and non-pharmacological method of pain management, encouraged to verbalize when in pain. Administered PRN pain meds as needed. Bed locked and in lowest position. Bed alarm on. Safety and fall precautions in place. Hourly rounding. Call light and belongings within reach.     Patient is not progressing towards the following goals:

## 2024-03-28 NOTE — PROGRESS NOTES
4 Eyes Skin Assessment Completed by SUNITA Myers and SUNITA Nash.    Head WDL  Ears WDL  Nose WDL  Mouth WDL  Neck WDL  Breast/Chest Redness under left breast  Shoulder Blades Scars bilateral shoulders  Spine Incision covered by dressing scant sanguineous drainage x1 hemovac drain and x1 on-Q pump in place  (R) Arm/Elbow/Hand Scar  (L) Arm/Elbow/Hand WDL  Abdomen Scar  Groin WDL  Scrotum/Coccyx/Buttocks WDL  (R) Leg Bruising  (L) Leg WDL  (R) Heel/Foot/Toe WDL  (L) Heel/Foot/Toe WDL          Devices In Places Blood Pressure Cuff, Pulse Ox, SCD's, and ADAM's      Interventions In Place Pressure Redistribution Mattress with waffle overlay     Possible Skin Injury No    Pictures Uploaded Into Epic N/A  Wound Consult Placed N/A  RN Wound Prevention Protocol Ordered No

## 2024-03-28 NOTE — CARE PLAN
The patient is Stable - Low risk of patient condition declining or worsening    Shift Goals  Clinical Goals: safety and Neuro checks  Patient Goals: comfort, sleep      Problem: Pain - Standard  Goal: Alleviation of pain or a reduction in pain to the patient’s comfort goal  Outcome: Progressing  Flowsheets (Taken 3/27/2024 2120)  Pain Rating Scale (NPRS): 0  Note: Patient will rest comfortably throughout the night without pain.     Problem: Fall Risk  Goal: Patient will remain free from falls  Outcome: Progressing  Note: Patient will use call light prior to exiting bed to bathroom.

## 2024-03-28 NOTE — DISCHARGE SUMMARY
Discharge Summary    CHIEF COMPLAINT ON ADMISSION  No chief complaint on file.      Reason for Admission  Neurogenic claudication due to lum*     Admission Date  3/27/2024    CODE STATUS  Full Code    HPI & HOSPITAL COURSE  7/17/2023   Rylee Cheung is a 78 y.o. female seen today in a neurosurgery/spine consultation.  Her daughter was in attendance at today's visit.  She reports breaking her back nearly 60 years ago.  She did well up until approximately 40 years ago.  At that time she was counseled to have surgery.  She has persisted with back pain since that time.  Today she reports greater than 20-year history of low back pain with bilateral leg pain.  This is dense aching through the entirety of her legs.  She does have associated numbness and tingling.  She had a nerve study several years ago and was told that she had neuropathy.  She presently rates her pain as 8-9/10.  Generally if she sits she does all right if she stands or walks for greater than 5 minutes she gets increasing back and leg symptoms.  She has done physical therapy in the past.  She has had good relief with massage and exercises in the gym.  Somewhere along the line she trialed gabapentin without relief.  She has been taking Aleve sparingly although this is upsetting her stomach.        In essence, this is a 78-year-old female with persistent back and bilateral leg symptomatology progressively worsening over the last 20 years.        She denies cardiac or pulmonary issues.  She does have history of diabetes which is controlled with exercise.  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  She is retired.  She was a  in the past.     10/3/2023  Returns for review.  Chief complaint maintained at low back and bilateral leg pain.  Since her last visit she trialed the meloxicam, Lyrica and Robaxin.  The meloxicam help with her knee  pain no significant improvement of her back symptoms.  She noticed no relief with Lyrica or Robaxin.  She does not do physical therapy as she does not feel this is helpful for her.  She had a CT myelogram, here to review.  She reiterates that she has claudicatory back and leg symptoms generally if she stands or walks for 5 to 10 minutes she must sit down.  She gets relief with a shopping cart and a walker.  This is interfering with her quality of life.  Her pain today is 5/10.        1/4/2024  Patient returns.  Had an epidural in October.   relief for 2 weeks.  This was in October.  When she is sitting she is good.  When she stands she gets predominantly feeling of weakness in the legs.  She has a positive shopping cart sign.  Options discussed.  Will start PT and do another epidural and regroup in 6 weeks.  Surgery is on the table.     2/16/2024  Patient returns.  Had another epidural.  Was good but with PT pain flared up.  At rest she has right leg pain down the back of the right leg to the knee.  When she stands she gets backache and bilateral buttock pain.  She is using a walker and seated balance standing.  Options discussed.  I think it is worthwhile decompressing it.  Symptoms worse with PT.     3/28/2024  Postoperative day #1 the patient is doing quite well.  The patient reports improvement of their preoperative leg symptoms.  There continues to be some incisional site discomfort which is well controlled with oral analgesics at this time.  The patient is eating and drinking without complication.  They are mobilizing well.  They do not report any nausea, vomiting, fever or headache.  They have remained hemodynamically stable.  Based upon the above information the patient will be discharged to home in a stable condition.        No notes on file    Therefore, she is discharged in good and stable condition to home with close outpatient follow-up.    The patient recovered much more quickly than anticipated on  "admission.    Discharge Date  3/28/2024     FOLLOW UP ITEMS POST DISCHARGE  Follow up with our office in 2, 6, and 12 weeks.  Report to ER with any complications.  Call our office with any questions or concerns.  No anti-inflammatories for 2 weeks, no blood thinners for 2 weeks.   Clear to shower Saturday, pat incision dry, no special creams or ointments.   Avoid bending, lifting and twisting.   Walk 4-6 times per day as tolerated to help prevent blood clots.     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 *      FOLLOW UP  Future Appointments   Date Time Provider Department Center   4/10/2024  4:00 PM [facility]ILYNN [facility]   5/9/2024  4:00 PM [facility]     No follow-up provider specified.    MEDICATIONS ON DISCHARGE     Medication List        START taking these medications        Instructions   acetaminophen 500 MG Tabs  Commonly known as: Tylenol   Take 2 Tablets by mouth every 6 hours.  Dose: 1,000 mg     baclofen 5 MG Tabs  Commonly known as: Lioresal   Take 1 Tablet by mouth 3 times a day as needed (Muscle spasms).  Dose: 5 mg     cephALEXin 500 MG Caps  Commonly known as: Keflex   Take 1 Capsule by mouth 4 times a day for 5 days.  Dose: 500 mg     traMADol 50 MG Tabs  Commonly known as: Ultram   Take 1 Tablet by mouth every 6 hours as needed for Severe Pain for up to 7 days.  Dose: 50 mg            CONTINUE taking these medications        Instructions   D-Mannose 500 MG Caps   Take 1,000 mg by mouth 2 times a day.  Dose: 1,000 mg     multivitamin Tabs   Take 2 Tablets by mouth 2 times a day.  Dose: 2 Tablet     NON SPECIFIED   Take 2 Capsules by mouth 2 times a day. \"Hydro-eye\"  Dose: 2 Capsule     Vitamin D 125 MCG (5000 UT) Caps   Take 1 Tablet by mouth 2 times a day.  Dose: 1 " Tablet     WELLNESS ESSENTIALS FOR WOMEN PO   Take 2 Tablets by mouth 2 times a day.  Dose: 2 Tablet            STOP taking these medications      Excedrin Migraine 250-250-65 MG Tabs  Generic drug: asa/apap/caffeine              Allergies  Allergies   Allergen Reactions    Ciprofloxacin      Doesn't work    Hmg-Coa-R Inhibitors      Statins- makes me feel bad    Hydrocodone      Hallucinations     Omeprazole      headaches    Oxycodone      HALLUCINATIONS       DIET  Orders Placed This Encounter   Procedures    Diet Order Diet: Regular     Standing Status:   Standing     Number of Occurrences:   1     Order Specific Question:   Diet:     Answer:   Regular [1]       ACTIVITY  As tolerated.  Weight bearing as tolerated    CONSULTATIONS  None    PROCEDURES  TITLE OF PROCEDURE:     L3 Decompressive lumbar laminectomy with bilateral foraminotomies  L4 Decompressive lumbar laminectomy with bilateral foraminotomies  L5 Decompressive lumbar laminectomy with bilateral foraminotomies  S1 Decompressive lumbar laminectomy with bilateral foraminotomies  i/o On-Q Pain catheters in paraspinal musculature    LABORATORY  Lab Results   Component Value Date    SODIUM 137 03/28/2024    POTASSIUM 5.1 03/28/2024    CHLORIDE 105 03/28/2024    CO2 22 03/28/2024    GLUCOSE 160 (H) 03/28/2024    BUN 16 03/28/2024    CREATININE 0.58 03/28/2024        Lab Results   Component Value Date    WBC 7.1 03/28/2024    HEMOGLOBIN 12.8 03/28/2024    HEMATOCRIT 38.0 03/28/2024    PLATELETCT 244 03/28/2024        Total time of the discharge process exceeds 30 minutes.

## 2024-03-28 NOTE — PROGRESS NOTES
Assumed care of patient at bedside report from Day RN. Updated on POC. Patient currently A & O x 4; on 3 L O2 nasal cannula; up 1 assist front wheel walker; without complaints of acute pain. Assessment completed Call light within reach. Whiteboard updated. Fall precautions in place. Bed locked and in lowest position. All questions answered. No other needs indicated at this time.

## 2024-03-28 NOTE — PROGRESS NOTES
Went over all discharge paperwork with patient and daughter no questions or concerns at this time. Pt. And daughter aware that drains will be removed after 2 and medications will be picked up at that time as well. Pt. To have all belongings packed.

## (undated) DEVICE — SUTURE 4-0 30CM STRATAFIX SPIRAL PS-2 (12EA/BX)

## (undated) DEVICE — LACTATED RINGERS INJ. 500 ML - (24EA/CA)

## (undated) DEVICE — ARMREST CRADLE FOAM - (2PR/PK 12PR/CA)

## (undated) DEVICE — SUCTION TUBE FRAZIER 12FR STERILE (40EA/CA)

## (undated) DEVICE — SLEEVE, VASO, THIGH, MED

## (undated) DEVICE — DEVICE MONOPOLAR RF PEAK PLASMABLADE 3.0S

## (undated) DEVICE — PACK NEURO - (2EA/CA)

## (undated) DEVICE — DRAPE LAPAROTOMY T SHEET - (12EA/CA)

## (undated) DEVICE — CHLORAPREP 26 ML APPLICATOR - ORANGE TINT(25/CA)

## (undated) DEVICE — GLOVE BIOGEL SZ 8 SURGICAL PF LTX - (50PR/BX 4BX/CA)

## (undated) DEVICE — ELECTRODE DUAL RETURN W/ CORD - (50/PK)

## (undated) DEVICE — HEADREST PRONEVIEW LARGE - (10/CA)

## (undated) DEVICE — SET LEADWIRE 5 LEAD BEDSIDE DISPOSABLE ECG (1SET OF 5/EA)

## (undated) DEVICE — SUCTION INSTRUMENT YANKAUER BULBOUS TIP W/O VENT (50EA/CA)

## (undated) DEVICE — GLOVE BIOGEL INDICATOR SZ 8 SURGICAL PF LTX - (50/BX 4BX/CA)

## (undated) DEVICE — SODIUM CHL IRRIGATION 0.9% 1000ML (12EA/CA)

## (undated) DEVICE — SPONGE GAUZESTER 4 X 4 4PLY - (128PK/CA)

## (undated) DEVICE — SEALER BIPOLAR 2.3 AQUAMANTYS

## (undated) DEVICE — DRAPE SURG STERI-DRAPE 7X11OD - (40EA/CA)

## (undated) DEVICE — TUBING C&T SET FLYING LEADS DRAIN TUBING (10EA/BX)

## (undated) DEVICE — CANISTER SUCTION 3000ML MECHANICAL FILTER AUTO SHUTOFF MEDI-VAC NONSTERILE LF DISP  (40EA/CA)

## (undated) DEVICE — DERMABOND ADVANCED - (12EA/BX)

## (undated) DEVICE — SUTURE 2-0 VICRYL PLUS CT-1 - 8 X 18 INCH(12/BX)

## (undated) DEVICE — PATTIES SURG X-RAYCOTTONOID - 1/2 X 3 IN (200/CA)

## (undated) DEVICE — COVER MAYO STAND X-LG - (22EA/CA)

## (undated) DEVICE — SENSOR OXIMETER ADULT SPO2 RD SET (20EA/BX)

## (undated) DEVICE — CATHETER ON-Q SILVER SOAKER 5IN  (5EA/CA)  - SUB ORDER #4428

## (undated) DEVICE — KIT SURGIFLO W/OUT THROMBIN - (6EA/CA)

## (undated) DEVICE — GOWN WARMING STANDARD FLEX - (30/CA)

## (undated) DEVICE — PUMP ON-Q 270 DUAL 2ML FIXED RATE (5EA/CA)

## (undated) DEVICE — COVER LIGHT HANDLE ALC PLUS DISP (18EA/BX)

## (undated) DEVICE — TUBING CLEARLINK DUO-VENT - C-FLO (48EA/CA)

## (undated) DEVICE — MIDAS LUBRICATOR DIFFUSER PACK (4EA/CA)

## (undated) DEVICE — SUTURE GENERAL

## (undated) DEVICE — TOOL MR8 14CM MATCH HD SYM-TRI 3MM DIAMETER (1/EA)

## (undated) DEVICE — DRAPE 36X28IN RAD CARM BND BG - (25/CA) O

## (undated) DEVICE — SUTURE 1 VICRYL PLUS CTX - 8 X 18 INCH (12/BX)

## (undated) DEVICE — SET EXTENSION WITH 2 PORTS (48EA/CA) ***PART #2C8610 IS A SUBSTITUTE*****

## (undated) DEVICE — KIT EVACUATER 3 SPRING PVC LF 1/8 DRAIN SIZE (10EA/CA)"

## (undated) DEVICE — LACTATED RINGERS INJ 1000 ML - (14EA/CA 60CA/PF)